# Patient Record
Sex: FEMALE | Race: WHITE | NOT HISPANIC OR LATINO
[De-identification: names, ages, dates, MRNs, and addresses within clinical notes are randomized per-mention and may not be internally consistent; named-entity substitution may affect disease eponyms.]

---

## 2021-08-02 ENCOUNTER — APPOINTMENT (OUTPATIENT)
Dept: INTERNAL MEDICINE | Facility: CLINIC | Age: 66
End: 2021-08-02
Payer: MEDICARE

## 2021-08-02 VITALS
WEIGHT: 117 LBS | SYSTOLIC BLOOD PRESSURE: 170 MMHG | TEMPERATURE: 98.1 F | HEIGHT: 66 IN | DIASTOLIC BLOOD PRESSURE: 90 MMHG | BODY MASS INDEX: 18.8 KG/M2

## 2021-08-02 VITALS — SYSTOLIC BLOOD PRESSURE: 132 MMHG | DIASTOLIC BLOOD PRESSURE: 80 MMHG

## 2021-08-02 DIAGNOSIS — Z85.828 PERSONAL HISTORY OF OTHER MALIGNANT NEOPLASM OF SKIN: ICD-10-CM

## 2021-08-02 DIAGNOSIS — Z78.9 OTHER SPECIFIED HEALTH STATUS: ICD-10-CM

## 2021-08-02 DIAGNOSIS — Z87.42 PERSONAL HISTORY OF OTHER DISEASES OF THE FEMALE GENITAL TRACT: ICD-10-CM

## 2021-08-02 DIAGNOSIS — Z82.49 FAMILY HISTORY OF ISCHEMIC HEART DISEASE AND OTHER DISEASES OF THE CIRCULATORY SYSTEM: ICD-10-CM

## 2021-08-02 DIAGNOSIS — Z82.3 FAMILY HISTORY OF STROKE: ICD-10-CM

## 2021-08-02 PROCEDURE — 99204 OFFICE O/P NEW MOD 45 MIN: CPT | Mod: 25

## 2021-08-02 PROCEDURE — 36415 COLL VENOUS BLD VENIPUNCTURE: CPT

## 2021-08-02 NOTE — HISTORY OF PRESENT ILLNESS
[FreeTextEntry8] : 66 year old F comes to establish medical care and for an acute visit. Prior followed by Evin Ortiz at [facility]. She prefers to switch to the [employer] system. \par \par She woke up last Thursday morning with low grade fever around 99F, had 1 reading of 100.7. She developed bilateral generalized abdominal discomfort described as aching with gas sensation. No radiation of pain. Sx not exacerbated with food intake. She denies N/V. She had 1 semi-loose bowel movement but otherwise no diarrhea. No urinary complaints. Recently travelled to [place] in 6/21. No new exotic foods. She has a sensitivity to soy products. She took Motrin and Tylenol with minimal relief. She had normal colonoscopy in 12/18.

## 2021-08-02 NOTE — REVIEW OF SYSTEMS
[Abdominal Pain] : abdominal pain [Nausea] : no nausea [Constipation] : no constipation [Diarrhea] : diarrhea [Heartburn] : no heartburn [Melena] : no melena [Dysuria] : no dysuria [Incontinence] : no incontinence [Hematuria] : no hematuria [Frequency] : no frequency [Joint Pain] : no joint pain [Back Pain] : no back pain [Itching] : no itching [Skin Rash] : no skin rash [Headache] : no headache [Dizziness] : no dizziness [Unsteady Walking] : no ataxia [Negative] : Respiratory

## 2021-08-02 NOTE — PHYSICAL EXAM
[No Lymphadenopathy] : no lymphadenopathy [Supple] : supple [Normal] : normal rate, regular rhythm, normal S1 and S2 and no murmur heard [No Edema] : there was no peripheral edema [Soft] : abdomen soft [Non Tender] : non-tender [Non-distended] : non-distended [No Masses] : no abdominal mass palpated [Normal Bowel Sounds] : normal bowel sounds [No Hernias] : no hernias [No CVA Tenderness] : no CVA  tenderness [No Rash] : no rash [Normal Gait] : normal gait [Normal Affect] : the affect was normal [Normal Mood] : the mood was normal [de-identified] : friendly, appears well  [de-identified] : multiple macules over torso

## 2021-08-04 ENCOUNTER — NON-APPOINTMENT (OUTPATIENT)
Age: 66
End: 2021-08-04

## 2021-08-04 LAB
ALBUMIN SERPL ELPH-MCNC: 4.6 G/DL
ALP BLD-CCNC: 104 U/L
ALT SERPL-CCNC: 9 U/L
AMYLASE/CREAT SERPL: 42 U/L
ANION GAP SERPL CALC-SCNC: 16 MMOL/L
APPEARANCE: CLEAR
AST SERPL-CCNC: 13 U/L
BACTERIA: NEGATIVE
BASOPHILS # BLD AUTO: 0.04 K/UL
BASOPHILS NFR BLD AUTO: 0.4 %
BILIRUB SERPL-MCNC: 0.8 MG/DL
BILIRUBIN URINE: NEGATIVE
BLOOD URINE: NEGATIVE
BUN SERPL-MCNC: 5 MG/DL
CALCIUM SERPL-MCNC: 9.5 MG/DL
CHLORIDE SERPL-SCNC: 88 MMOL/L
CO2 SERPL-SCNC: 25 MMOL/L
COLOR: NORMAL
CREAT SERPL-MCNC: 0.48 MG/DL
CRP SERPL-MCNC: 172 MG/L
EOSINOPHIL # BLD AUTO: 0.1 K/UL
EOSINOPHIL NFR BLD AUTO: 0.9 %
ERYTHROCYTE [SEDIMENTATION RATE] IN BLOOD BY WESTERGREN METHOD: 27 MM/HR
GLUCOSE QUALITATIVE U: NEGATIVE
GLUCOSE SERPL-MCNC: 123 MG/DL
HCT VFR BLD CALC: 42.8 %
HGB BLD-MCNC: 14.2 G/DL
HYALINE CASTS: 0 /LPF
IMM GRANULOCYTES NFR BLD AUTO: 0.3 %
KETONES URINE: NORMAL
LEUKOCYTE ESTERASE URINE: NEGATIVE
LPL SERPL-CCNC: 14 U/L
LYMPHOCYTES # BLD AUTO: 1.58 K/UL
LYMPHOCYTES NFR BLD AUTO: 14.2 %
MAN DIFF?: NORMAL
MCHC RBC-ENTMCNC: 30.2 PG
MCHC RBC-ENTMCNC: 33.2 GM/DL
MCV RBC AUTO: 91.1 FL
MICROSCOPIC-UA: NORMAL
MONOCYTES # BLD AUTO: 0.97 K/UL
MONOCYTES NFR BLD AUTO: 8.7 %
NEUTROPHILS # BLD AUTO: 8.4 K/UL
NEUTROPHILS NFR BLD AUTO: 75.5 %
NITRITE URINE: NEGATIVE
PH URINE: 6.5
PLATELET # BLD AUTO: 391 K/UL
POTASSIUM SERPL-SCNC: 3.6 MMOL/L
PROT SERPL-MCNC: 7.7 G/DL
PROTEIN URINE: NEGATIVE
RBC # BLD: 4.7 M/UL
RBC # FLD: 12.4 %
RED BLOOD CELLS URINE: 0 /HPF
SODIUM SERPL-SCNC: 129 MMOL/L
SPECIFIC GRAVITY URINE: 1
SQUAMOUS EPITHELIAL CELLS: 0 /HPF
UROBILINOGEN URINE: NORMAL
WBC # FLD AUTO: 11.12 K/UL
WHITE BLOOD CELLS URINE: 1 /HPF

## 2021-08-05 LAB — BACTERIA UR CULT: NORMAL

## 2022-01-11 ENCOUNTER — APPOINTMENT (OUTPATIENT)
Dept: INTERNAL MEDICINE | Facility: CLINIC | Age: 67
End: 2022-01-11
Payer: MEDICARE

## 2022-01-11 ENCOUNTER — TRANSCRIPTION ENCOUNTER (OUTPATIENT)
Age: 67
End: 2022-01-11

## 2022-01-11 ENCOUNTER — NON-APPOINTMENT (OUTPATIENT)
Age: 67
End: 2022-01-11

## 2022-01-11 VITALS
OXYGEN SATURATION: 98 % | DIASTOLIC BLOOD PRESSURE: 100 MMHG | BODY MASS INDEX: 18.8 KG/M2 | HEART RATE: 113 BPM | WEIGHT: 117 LBS | HEIGHT: 66 IN | SYSTOLIC BLOOD PRESSURE: 150 MMHG | TEMPERATURE: 97.9 F

## 2022-01-11 VITALS — DIASTOLIC BLOOD PRESSURE: 80 MMHG | SYSTOLIC BLOOD PRESSURE: 148 MMHG

## 2022-01-11 DIAGNOSIS — Z23 ENCOUNTER FOR IMMUNIZATION: ICD-10-CM

## 2022-01-11 DIAGNOSIS — K52.9 NONINFECTIVE GASTROENTERITIS AND COLITIS, UNSPECIFIED: ICD-10-CM

## 2022-01-11 DIAGNOSIS — R50.9 FEVER, UNSPECIFIED: ICD-10-CM

## 2022-01-11 DIAGNOSIS — Z91.018 ALLERGY TO OTHER FOODS: ICD-10-CM

## 2022-01-11 PROCEDURE — 36415 COLL VENOUS BLD VENIPUNCTURE: CPT

## 2022-01-11 PROCEDURE — 93000 ELECTROCARDIOGRAM COMPLETE: CPT

## 2022-01-11 PROCEDURE — G0438: CPT

## 2022-01-11 RX ORDER — ALBUTEROL SULFATE 90 UG/1
108 (90 BASE) AEROSOL, METERED RESPIRATORY (INHALATION)
Qty: 3 | Refills: 3 | Status: ACTIVE | COMMUNITY

## 2022-01-11 NOTE — PHYSICAL EXAM
[No Acute Distress] : no acute distress [Well Nourished] : well nourished [Well Developed] : well developed [Well-Appearing] : well-appearing [Normal Sclera/Conjunctiva] : normal sclera/conjunctiva [PERRL] : pupils equal round and reactive to light [EOMI] : extraocular movements intact [Normal Outer Ear/Nose] : the outer ears and nose were normal in appearance [Normal Oropharynx] : the oropharynx was normal [Normal TMs] : both tympanic membranes were normal [No JVD] : no jugular venous distention [No Lymphadenopathy] : no lymphadenopathy [Supple] : supple [Thyroid Normal, No Nodules] : the thyroid was normal and there were no nodules present [No Respiratory Distress] : no respiratory distress  [No Accessory Muscle Use] : no accessory muscle use [Clear to Auscultation] : lungs were clear to auscultation bilaterally [Normal Rate] : normal rate  [Regular Rhythm] : with a regular rhythm [Normal S1, S2] : normal S1 and S2 [No Murmur] : no murmur heard [No Carotid Bruits] : no carotid bruits [No Varicosities] : no varicosities [Pedal Pulses Present] : the pedal pulses are present [No Edema] : there was no peripheral edema [Soft] : abdomen soft [Non Tender] : non-tender [Non-distended] : non-distended [No Masses] : no abdominal mass palpated [No HSM] : no HSM [Normal Bowel Sounds] : normal bowel sounds [Normal Posterior Cervical Nodes] : no posterior cervical lymphadenopathy [Normal Anterior Cervical Nodes] : no anterior cervical lymphadenopathy [No CVA Tenderness] : no CVA  tenderness [No Spinal Tenderness] : no spinal tenderness [No Joint Swelling] : no joint swelling [Grossly Normal Strength/Tone] : grossly normal strength/tone [No Rash] : no rash [Coordination Grossly Intact] : coordination grossly intact [No Focal Deficits] : no focal deficits [Normal Gait] : normal gait [Deep Tendon Reflexes (DTR)] : deep tendon reflexes were 2+ and symmetric [Normal Affect] : the affect was normal [Normal Mood] : the mood was normal [Normal Insight/Judgement] : insight and judgment were intact [de-identified] : friendly [de-identified] : defer to GYN [de-identified] : multiple nevi on torso and back

## 2022-01-11 NOTE — HEALTH RISK ASSESSMENT
[Excellent] : ~his/her~  mood as  excellent [Never] : Never [No] : In the past 12 months have you used drugs other than those required for medical reasons? No [No falls in past year] : Patient reported no falls in the past year [0] : 2) Feeling down, depressed, or hopeless: Not at all (0) [PHQ-2 Negative - No further assessment needed] : PHQ-2 Negative - No further assessment needed [With Family] : lives with family [Retired] : retired [] :  [# Of Children ___] : has [unfilled] children [Sexually Active] : sexually active [Fully functional (bathing, dressing, toileting, transferring, walking, feeding)] : Fully functional (bathing, dressing, toileting, transferring, walking, feeding) [Fully functional (using the telephone, shopping, preparing meals, housekeeping, doing laundry, using] : Fully functional and needs no help or supervision to perform IADLs (using the telephone, shopping, preparing meals, housekeeping, doing laundry, using transportation, managing medications and managing finances) [Smoke Detector] : smoke detector [Carbon Monoxide Detector] : carbon monoxide detector [Seat Belt] :  uses seat belt [Sunscreen] : uses sunscreen [Patient reported mammogram was normal] : Patient reported mammogram was normal [Patient reported colonoscopy was normal] : Patient reported colonoscopy was normal [[id number]] : 0 [High Risk Behavior] : no high risk behavior [Reports changes in hearing] : Reports no changes in hearing [Reports changes in vision] : Reports no changes in vision [Reports changes in dental health] : Reports no changes in dental health [MammogramDate] : 10/18 [PapSmearComments] : hysterectomy  [ColonoscopyDate] : 07/18 [ColonoscopyComments] : Dr. Preet Cooper

## 2022-01-11 NOTE — PLAN
[FreeTextEntry1] : Check routine fasting labs.\par Discussed diet, exercise, and weight maintenance. Referred to nutritionist to help her gain weight.\par Received COVID vaccine x 3. Declined flu vaccine today.\par Colonoscopy UTD. She will soon schedule for mammogram. \par Hx of HLD, check fasting lipids and A1c.\par BP borderline high due to white coat syndrome. \par Continue Singulair and Asmanex for asthma. Referred to allergist Dr. Boxer for management, has soy allergy.\par \par RTO in 1 yr for annual exam or earlier PRN for acute care.\par

## 2022-01-11 NOTE — HISTORY OF PRESENT ILLNESS
[FreeTextEntry1] : physical [de-identified] : Patient comes for an annual exam.\par \par She always feels nervous coming to doctor appointments. History of white coat syndrome. \par She has new SCC on right side of face, will soon undergo laser therapy. Follows closely with dermatologist.

## 2022-01-17 ENCOUNTER — TRANSCRIPTION ENCOUNTER (OUTPATIENT)
Age: 67
End: 2022-01-17

## 2022-01-17 DIAGNOSIS — E87.1 HYPO-OSMOLALITY AND HYPONATREMIA: ICD-10-CM

## 2022-01-17 LAB
25(OH)D3 SERPL-MCNC: 21.1 NG/ML
ALBUMIN SERPL ELPH-MCNC: 4.9 G/DL
ALP BLD-CCNC: 93 U/L
ALT SERPL-CCNC: 14 U/L
ANION GAP SERPL CALC-SCNC: 15 MMOL/L
APPEARANCE: CLEAR
AST SERPL-CCNC: 20 U/L
BACTERIA: NEGATIVE
BASOPHILS # BLD AUTO: 0.08 K/UL
BASOPHILS NFR BLD AUTO: 1.3 %
BILIRUB SERPL-MCNC: 0.8 MG/DL
BILIRUBIN URINE: NEGATIVE
BLOOD URINE: NEGATIVE
BUN SERPL-MCNC: 8 MG/DL
CALCIUM SERPL-MCNC: 9.3 MG/DL
CHLORIDE SERPL-SCNC: 91 MMOL/L
CHOLEST SERPL-MCNC: 231 MG/DL
CO2 SERPL-SCNC: 25 MMOL/L
COLOR: NORMAL
CREAT SERPL-MCNC: 0.6 MG/DL
CRP SERPL-MCNC: <3 MG/L
EOSINOPHIL # BLD AUTO: 0.25 K/UL
EOSINOPHIL NFR BLD AUTO: 4 %
ERYTHROCYTE [SEDIMENTATION RATE] IN BLOOD BY WESTERGREN METHOD: 11 MM/HR
ESTIMATED AVERAGE GLUCOSE: 120 MG/DL
GLUCOSE QUALITATIVE U: NEGATIVE
GLUCOSE SERPL-MCNC: 132 MG/DL
HBA1C MFR BLD HPLC: 5.8 %
HCT VFR BLD CALC: 46.6 %
HDLC SERPL-MCNC: 73 MG/DL
HGB BLD-MCNC: 15.2 G/DL
HYALINE CASTS: 0 /LPF
IMM GRANULOCYTES NFR BLD AUTO: 0.3 %
KETONES URINE: NORMAL
LDLC SERPL CALC-MCNC: 142 MG/DL
LEUKOCYTE ESTERASE URINE: NEGATIVE
LYMPHOCYTES # BLD AUTO: 1.3 K/UL
LYMPHOCYTES NFR BLD AUTO: 20.7 %
MAN DIFF?: NORMAL
MCHC RBC-ENTMCNC: 29.8 PG
MCHC RBC-ENTMCNC: 32.6 GM/DL
MCV RBC AUTO: 91.4 FL
MICROSCOPIC-UA: NORMAL
MONOCYTES # BLD AUTO: 0.52 K/UL
MONOCYTES NFR BLD AUTO: 8.3 %
NEUTROPHILS # BLD AUTO: 4.11 K/UL
NEUTROPHILS NFR BLD AUTO: 65.4 %
NITRITE URINE: NEGATIVE
NONHDLC SERPL-MCNC: 157 MG/DL
PH URINE: 7
PLATELET # BLD AUTO: 352 K/UL
POTASSIUM SERPL-SCNC: 3.8 MMOL/L
PROT SERPL-MCNC: 7.9 G/DL
PROTEIN URINE: NEGATIVE
RBC # BLD: 5.1 M/UL
RBC # FLD: 12.4 %
RED BLOOD CELLS URINE: 0 /HPF
SODIUM SERPL-SCNC: 131 MMOL/L
SPECIFIC GRAVITY URINE: 1.02
SQUAMOUS EPITHELIAL CELLS: 0 /HPF
TRIGL SERPL-MCNC: 75 MG/DL
TSH SERPL-ACNC: 1.97 UIU/ML
UROBILINOGEN URINE: NORMAL
VIT B12 SERPL-MCNC: 314 PG/ML
WBC # FLD AUTO: 6.28 K/UL
WHITE BLOOD CELLS URINE: 0 /HPF

## 2022-01-17 RX ORDER — MULTIVIT-MIN/FOLIC/VIT K/LYCOP 400-300MCG
50 MCG TABLET ORAL
Qty: 90 | Refills: 3 | Status: ACTIVE | COMMUNITY
Start: 2022-01-17

## 2022-02-09 ENCOUNTER — TRANSCRIPTION ENCOUNTER (OUTPATIENT)
Age: 67
End: 2022-02-09

## 2022-03-07 ENCOUNTER — TRANSCRIPTION ENCOUNTER (OUTPATIENT)
Age: 67
End: 2022-03-07

## 2022-03-10 ENCOUNTER — TRANSCRIPTION ENCOUNTER (OUTPATIENT)
Age: 67
End: 2022-03-10

## 2022-03-14 ENCOUNTER — TRANSCRIPTION ENCOUNTER (OUTPATIENT)
Age: 67
End: 2022-03-14

## 2022-03-15 ENCOUNTER — TRANSCRIPTION ENCOUNTER (OUTPATIENT)
Age: 67
End: 2022-03-15

## 2022-04-25 ENCOUNTER — TRANSCRIPTION ENCOUNTER (OUTPATIENT)
Age: 67
End: 2022-04-25

## 2022-04-25 ENCOUNTER — NON-APPOINTMENT (OUTPATIENT)
Age: 67
End: 2022-04-25

## 2022-05-31 ENCOUNTER — RX RENEWAL (OUTPATIENT)
Age: 67
End: 2022-05-31

## 2022-05-31 ENCOUNTER — TRANSCRIPTION ENCOUNTER (OUTPATIENT)
Age: 67
End: 2022-05-31

## 2022-06-27 ENCOUNTER — RX CHANGE (OUTPATIENT)
Age: 67
End: 2022-06-27

## 2022-06-29 ENCOUNTER — RX CHANGE (OUTPATIENT)
Age: 67
End: 2022-06-29

## 2022-07-11 ENCOUNTER — TRANSCRIPTION ENCOUNTER (OUTPATIENT)
Age: 67
End: 2022-07-11

## 2022-08-18 ENCOUNTER — TRANSCRIPTION ENCOUNTER (OUTPATIENT)
Age: 67
End: 2022-08-18

## 2022-12-08 ENCOUNTER — RX CHANGE (OUTPATIENT)
Age: 67
End: 2022-12-08

## 2023-02-28 ENCOUNTER — RX CHANGE (OUTPATIENT)
Age: 68
End: 2023-02-28

## 2023-02-28 ENCOUNTER — TRANSCRIPTION ENCOUNTER (OUTPATIENT)
Age: 68
End: 2023-02-28

## 2023-03-02 ENCOUNTER — TRANSCRIPTION ENCOUNTER (OUTPATIENT)
Age: 68
End: 2023-03-02

## 2023-03-02 RX ORDER — ALBUTEROL SULFATE 90 UG/1
108 (90 BASE) INHALANT RESPIRATORY (INHALATION)
Qty: 1 | Refills: 3 | Status: ACTIVE | COMMUNITY
Start: 2023-03-02 | End: 1900-01-01

## 2023-06-06 ENCOUNTER — TRANSCRIPTION ENCOUNTER (OUTPATIENT)
Age: 68
End: 2023-06-06

## 2023-06-06 ENCOUNTER — RX RENEWAL (OUTPATIENT)
Age: 68
End: 2023-06-06

## 2023-06-08 ENCOUNTER — TRANSCRIPTION ENCOUNTER (OUTPATIENT)
Age: 68
End: 2023-06-08

## 2023-06-12 ENCOUNTER — RX CHANGE (OUTPATIENT)
Age: 68
End: 2023-06-12

## 2023-06-14 NOTE — PLAN
[FreeTextEntry1] : I suspect she has possible acute gastroenteritis.\par Check labs - CBC, CMP, ESR, CRP, amylase, and lipase. Check UA and urine culture.\par If pain worsens, will send for CT A/P.\par Drink plenty of fluids. Switch to low fat / BRAT diet. Advance diet as tolerated. \par Hx of white coat syndrome, BP eventually normalized.\par Hx of hyperlipidemia, has not had labs since 2018. Will check fasting labs at next visit.\par \par RTO 1 month for annual physical.  1 pair

## 2023-06-20 ENCOUNTER — APPOINTMENT (OUTPATIENT)
Dept: INTERNAL MEDICINE | Facility: CLINIC | Age: 68
End: 2023-06-20
Payer: MEDICARE

## 2023-06-20 ENCOUNTER — NON-APPOINTMENT (OUTPATIENT)
Age: 68
End: 2023-06-20

## 2023-06-20 VITALS
HEART RATE: 86 BPM | BODY MASS INDEX: 18.96 KG/M2 | SYSTOLIC BLOOD PRESSURE: 152 MMHG | OXYGEN SATURATION: 98 % | WEIGHT: 118 LBS | HEIGHT: 66 IN | DIASTOLIC BLOOD PRESSURE: 72 MMHG

## 2023-06-20 VITALS — DIASTOLIC BLOOD PRESSURE: 80 MMHG | SYSTOLIC BLOOD PRESSURE: 156 MMHG

## 2023-06-20 DIAGNOSIS — Z12.31 ENCOUNTER FOR SCREENING MAMMOGRAM FOR MALIGNANT NEOPLASM OF BREAST: ICD-10-CM

## 2023-06-20 DIAGNOSIS — Z85.828 PERSONAL HISTORY OF OTHER MALIGNANT NEOPLASM OF SKIN: ICD-10-CM

## 2023-06-20 PROCEDURE — 93000 ELECTROCARDIOGRAM COMPLETE: CPT | Mod: 59

## 2023-06-20 PROCEDURE — G0439: CPT

## 2023-06-20 NOTE — HEALTH RISK ASSESSMENT
[Excellent] : ~his/her~  mood as  excellent [No] : In the past 12 months have you used drugs other than those required for medical reasons? No [No falls in past year] : Patient reported no falls in the past year [0] : 2) Feeling down, depressed, or hopeless: Not at all (0) [PHQ-2 Negative - No further assessment needed] : PHQ-2 Negative - No further assessment needed [Patient reported mammogram was normal] : Patient reported mammogram was normal [Patient reported colonoscopy was normal] : Patient reported colonoscopy was normal [With Family] : lives with family [Retired] : retired [] :  [# Of Children ___] : has [unfilled] children [Sexually Active] : sexually active [Fully functional (bathing, dressing, toileting, transferring, walking, feeding)] : Fully functional (bathing, dressing, toileting, transferring, walking, feeding) [Fully functional (using the telephone, shopping, preparing meals, housekeeping, doing laundry, using] : Fully functional and needs no help or supervision to perform IADLs (using the telephone, shopping, preparing meals, housekeeping, doing laundry, using transportation, managing medications and managing finances) [Smoke Detector] : smoke detector [Carbon Monoxide Detector] : carbon monoxide detector [Seat Belt] :  uses seat belt [Sunscreen] : uses sunscreen [Never] : Never [[id number]] : 0 [High Risk Behavior] : no high risk behavior [Reports changes in hearing] : Reports no changes in hearing [Reports changes in vision] : Reports no changes in vision [Reports changes in dental health] : Reports no changes in dental health [MammogramDate] : 07/22 [MammogramComments] : BIRADS 1 [PapSmearComments] : hysterectomy  [ColonoscopyDate] : 07/18 [ColonoscopyComments] : Dr. Preet Cooper

## 2023-06-20 NOTE — PHYSICAL EXAM
[No Acute Distress] : no acute distress [Well Nourished] : well nourished [Well Developed] : well developed [Well-Appearing] : well-appearing [Normal Sclera/Conjunctiva] : normal sclera/conjunctiva [PERRL] : pupils equal round and reactive to light [EOMI] : extraocular movements intact [Normal Outer Ear/Nose] : the outer ears and nose were normal in appearance [Normal Oropharynx] : the oropharynx was normal [No JVD] : no jugular venous distention [No Lymphadenopathy] : no lymphadenopathy [Supple] : supple [Thyroid Normal, No Nodules] : the thyroid was normal and there were no nodules present [No Respiratory Distress] : no respiratory distress  [No Accessory Muscle Use] : no accessory muscle use [Clear to Auscultation] : lungs were clear to auscultation bilaterally [Normal Rate] : normal rate  [Regular Rhythm] : with a regular rhythm [Normal S1, S2] : normal S1 and S2 [No Murmur] : no murmur heard [No Carotid Bruits] : no carotid bruits [No Varicosities] : no varicosities [Pedal Pulses Present] : the pedal pulses are present [No Edema] : there was no peripheral edema [Soft] : abdomen soft [Non Tender] : non-tender [Non-distended] : non-distended [No Masses] : no abdominal mass palpated [No HSM] : no HSM [Normal Bowel Sounds] : normal bowel sounds [No CVA Tenderness] : no CVA  tenderness [No Spinal Tenderness] : no spinal tenderness [No Joint Swelling] : no joint swelling [Grossly Normal Strength/Tone] : grossly normal strength/tone [No Rash] : no rash [Coordination Grossly Intact] : coordination grossly intact [No Focal Deficits] : no focal deficits [Normal Gait] : normal gait [Deep Tendon Reflexes (DTR)] : deep tendon reflexes were 2+ and symmetric [Normal Affect] : the affect was normal [Normal Mood] : the mood was normal [Normal Insight/Judgement] : insight and judgment were intact [de-identified] : friendly [de-identified] : b/l occluded dry cerumen [de-identified] : defer to GYN [de-identified] : dry erythematous patches on face; multiple nevi/ seborrheic keratoses on torso and back

## 2023-06-20 NOTE — PLAN
[FreeTextEntry1] : Check routine labs. Not fasting today so provided lab slip.\par Discussed diet, exercise, and weight maintenance. Weight normal. \par Received COVID vaccine x 4. Declined flu and pneumococcal vaccines.\par Colonoscopy UTD. \par Schedule mammogram for 7/23, Rx provide.\par Hx of HLD and borderline DM, check fasting lipids and A1c. Watches diet well.\par BP always remains elevated due to white coat syndrome. \par Continue Singulair and Asmanex for asthma. She will schedule consult with allergist Dr. Boxer, has soy allergy.\par Followed with derm, hx of BCC and SCC.\par Check vitamin D level, continue supplement.\par \par RTO in 1 yr for annual exam or earlier PRN for acute care.\par

## 2023-06-20 NOTE — HISTORY OF PRESENT ILLNESS
[FreeTextEntry1] : physical [de-identified] : Patient comes for an annual exam.\par \par She reports feeling well. \par Recently had recurrent SCC of face, had radiation treatment. Followed with derm in [place].\par She travels to [place] often, has home there.

## 2023-06-22 LAB
BASOPHILS # BLD AUTO: 0.06 K/UL
BASOPHILS NFR BLD AUTO: 1.1 %
EOSINOPHIL # BLD AUTO: 0.43 K/UL
EOSINOPHIL NFR BLD AUTO: 7.7 %
HCT VFR BLD CALC: 44.9 %
HGB BLD-MCNC: 15.2 G/DL
IMM GRANULOCYTES NFR BLD AUTO: 0.4 %
LYMPHOCYTES # BLD AUTO: 1.99 K/UL
LYMPHOCYTES NFR BLD AUTO: 35.4 %
MAN DIFF?: NORMAL
MCHC RBC-ENTMCNC: 30.8 PG
MCHC RBC-ENTMCNC: 33.9 GM/DL
MCV RBC AUTO: 91.1 FL
MONOCYTES # BLD AUTO: 0.59 K/UL
MONOCYTES NFR BLD AUTO: 10.5 %
NEUTROPHILS # BLD AUTO: 2.53 K/UL
NEUTROPHILS NFR BLD AUTO: 44.9 %
PLATELET # BLD AUTO: 326 K/UL
RBC # BLD: 4.93 M/UL
RBC # FLD: 12.4 %
WBC # FLD AUTO: 5.62 K/UL

## 2023-06-23 ENCOUNTER — TRANSCRIPTION ENCOUNTER (OUTPATIENT)
Age: 68
End: 2023-06-23

## 2023-06-23 DIAGNOSIS — E53.8 DEFICIENCY OF OTHER SPECIFIED B GROUP VITAMINS: ICD-10-CM

## 2023-06-23 LAB
25(OH)D3 SERPL-MCNC: 43.1 NG/ML
ALBUMIN SERPL ELPH-MCNC: 4.8 G/DL
ALP BLD-CCNC: 92 U/L
ALT SERPL-CCNC: 13 U/L
ANION GAP SERPL CALC-SCNC: 13 MMOL/L
APPEARANCE: CLEAR
AST SERPL-CCNC: 18 U/L
BACTERIA: NEGATIVE /HPF
BILIRUB SERPL-MCNC: 0.9 MG/DL
BILIRUBIN URINE: NEGATIVE
BLOOD URINE: NEGATIVE
BUN SERPL-MCNC: 8 MG/DL
CALCIUM SERPL-MCNC: 9.2 MG/DL
CAST: 0 /LPF
CHLORIDE SERPL-SCNC: 89 MMOL/L
CHOLEST SERPL-MCNC: 245 MG/DL
CO2 SERPL-SCNC: 28 MMOL/L
COLOR: YELLOW
CREAT SERPL-MCNC: 0.59 MG/DL
EGFR: 98 ML/MIN/1.73M2
EPITHELIAL CELLS: 0 /HPF
ESTIMATED AVERAGE GLUCOSE: 120 MG/DL
GLUCOSE QUALITATIVE U: NEGATIVE MG/DL
GLUCOSE SERPL-MCNC: 109 MG/DL
HBA1C MFR BLD HPLC: 5.8 %
HDLC SERPL-MCNC: 81 MG/DL
KETONES URINE: NEGATIVE MG/DL
LDLC SERPL CALC-MCNC: 151 MG/DL
LEUKOCYTE ESTERASE URINE: NEGATIVE
MICROSCOPIC-UA: NORMAL
NITRITE URINE: NEGATIVE
NONHDLC SERPL-MCNC: 164 MG/DL
PH URINE: 7.5
POTASSIUM SERPL-SCNC: 3.7 MMOL/L
PROT SERPL-MCNC: 7.5 G/DL
PROTEIN URINE: NEGATIVE MG/DL
RED BLOOD CELLS URINE: 0 /HPF
SODIUM SERPL-SCNC: 130 MMOL/L
SPECIFIC GRAVITY URINE: 1
TRIGL SERPL-MCNC: 66 MG/DL
TSH SERPL-ACNC: 3.13 UIU/ML
UROBILINOGEN URINE: 0.2 MG/DL
VIT B12 SERPL-MCNC: 290 PG/ML
WHITE BLOOD CELLS URINE: 0 /HPF

## 2023-06-23 RX ORDER — ACETAMINOPHEN/DIPHENHYDRAMINE 500MG-25MG
1000 TABLET ORAL DAILY
Qty: 90 | Refills: 0 | Status: ACTIVE | COMMUNITY
Start: 2023-06-23

## 2023-06-27 ENCOUNTER — TRANSCRIPTION ENCOUNTER (OUTPATIENT)
Age: 68
End: 2023-06-27

## 2023-09-26 ENCOUNTER — TRANSCRIPTION ENCOUNTER (OUTPATIENT)
Age: 68
End: 2023-09-26

## 2023-10-23 ENCOUNTER — RX RENEWAL (OUTPATIENT)
Age: 68
End: 2023-10-23

## 2023-10-23 ENCOUNTER — TRANSCRIPTION ENCOUNTER (OUTPATIENT)
Age: 68
End: 2023-10-23

## 2023-10-23 RX ORDER — MOMETASONE FUROATE 220 UG/1
220 INHALANT RESPIRATORY (INHALATION)
Qty: 3 | Refills: 3 | Status: ACTIVE | COMMUNITY
Start: 2023-10-23 | End: 1900-01-01

## 2023-12-12 ENCOUNTER — TRANSCRIPTION ENCOUNTER (OUTPATIENT)
Age: 68
End: 2023-12-12

## 2024-02-02 ENCOUNTER — TRANSCRIPTION ENCOUNTER (OUTPATIENT)
Age: 69
End: 2024-02-02

## 2024-03-06 ENCOUNTER — TRANSCRIPTION ENCOUNTER (OUTPATIENT)
Age: 69
End: 2024-03-06

## 2024-06-13 ENCOUNTER — TRANSCRIPTION ENCOUNTER (OUTPATIENT)
Age: 69
End: 2024-06-13

## 2024-06-13 RX ORDER — MONTELUKAST SODIUM 10 MG/1
10 TABLET, FILM COATED ORAL
Qty: 90 | Refills: 0 | Status: ACTIVE | COMMUNITY
Start: 1900-01-01 | End: 1900-01-01

## 2024-06-30 PROBLEM — R73.03 BORDERLINE DIABETES: Status: ACTIVE | Noted: 2022-01-17

## 2024-06-30 PROBLEM — E78.5 HYPERLIPIDEMIA: Status: ACTIVE | Noted: 2021-08-02

## 2024-06-30 PROBLEM — E55.9 VITAMIN D DEFICIENCY: Status: ACTIVE | Noted: 2022-01-17

## 2024-06-30 PROBLEM — J45.909 ASTHMA: Status: ACTIVE | Noted: 2021-08-02

## 2024-06-30 PROBLEM — Z00.00 ENCOUNTER FOR PREVENTIVE HEALTH EXAMINATION: Status: ACTIVE | Noted: 2021-08-02

## 2024-07-01 ENCOUNTER — NON-APPOINTMENT (OUTPATIENT)
Age: 69
End: 2024-07-01

## 2024-07-01 ENCOUNTER — APPOINTMENT (OUTPATIENT)
Dept: INTERNAL MEDICINE | Facility: CLINIC | Age: 69
End: 2024-07-01
Payer: MEDICARE

## 2024-07-01 VITALS
HEART RATE: 102 BPM | OXYGEN SATURATION: 96 % | SYSTOLIC BLOOD PRESSURE: 192 MMHG | DIASTOLIC BLOOD PRESSURE: 93 MMHG | HEIGHT: 66 IN | WEIGHT: 117 LBS | BODY MASS INDEX: 18.8 KG/M2 | RESPIRATION RATE: 12 BRPM

## 2024-07-01 VITALS — SYSTOLIC BLOOD PRESSURE: 166 MMHG | DIASTOLIC BLOOD PRESSURE: 80 MMHG

## 2024-07-01 DIAGNOSIS — L71.9 ROSACEA, UNSPECIFIED: ICD-10-CM

## 2024-07-01 DIAGNOSIS — R73.03 PREDIABETES.: ICD-10-CM

## 2024-07-01 DIAGNOSIS — E78.5 HYPERLIPIDEMIA, UNSPECIFIED: ICD-10-CM

## 2024-07-01 DIAGNOSIS — J45.909 UNSPECIFIED ASTHMA, UNCOMPLICATED: ICD-10-CM

## 2024-07-01 DIAGNOSIS — Z00.00 ENCOUNTER FOR GENERAL ADULT MEDICAL EXAMINATION W/OUT ABNORMAL FINDINGS: ICD-10-CM

## 2024-07-01 DIAGNOSIS — E55.9 VITAMIN D DEFICIENCY, UNSPECIFIED: ICD-10-CM

## 2024-07-01 DIAGNOSIS — R03.0 ELEVATED BLOOD-PRESSURE READING, W/OUT DIAGNOSIS OF HYPERTENSION: ICD-10-CM

## 2024-07-01 DIAGNOSIS — C44.320 SQUAMOUS CELL CARCINOMA OF SKIN OF UNSPECIFIED PARTS OF FACE: ICD-10-CM

## 2024-07-01 PROCEDURE — 93000 ELECTROCARDIOGRAM COMPLETE: CPT

## 2024-07-01 PROCEDURE — G0439: CPT

## 2024-07-12 ENCOUNTER — TRANSCRIPTION ENCOUNTER (OUTPATIENT)
Age: 69
End: 2024-07-12

## 2024-07-12 LAB
25(OH)D3 SERPL-MCNC: 38.8 NG/ML
ALBUMIN SERPL ELPH-MCNC: 4.7 G/DL
ALP BLD-CCNC: 80 U/L
ALT SERPL-CCNC: 17 U/L
ANION GAP SERPL CALC-SCNC: 13 MMOL/L
APPEARANCE: CLEAR
AST SERPL-CCNC: 24 U/L
BACTERIA: NEGATIVE /HPF
BASOPHILS # BLD AUTO: 0.04 K/UL
BASOPHILS NFR BLD AUTO: 0.7 %
BILIRUB SERPL-MCNC: 0.8 MG/DL
BILIRUBIN URINE: NEGATIVE
BLOOD URINE: NEGATIVE
BUN SERPL-MCNC: 7 MG/DL
CALCIUM SERPL-MCNC: 9.1 MG/DL
CAST: 0 /LPF
CHLORIDE SERPL-SCNC: 87 MMOL/L
CHOLEST SERPL-MCNC: 226 MG/DL
CO2 SERPL-SCNC: 28 MMOL/L
COLOR: YELLOW
CREAT SERPL-MCNC: 0.59 MG/DL
EGFR: 97 ML/MIN/1.73M2
EOSINOPHIL # BLD AUTO: 0.27 K/UL
EOSINOPHIL NFR BLD AUTO: 4.6 %
EPITHELIAL CELLS: 0 /HPF
ESTIMATED AVERAGE GLUCOSE: 114 MG/DL
GLUCOSE QUALITATIVE U: NEGATIVE MG/DL
GLUCOSE SERPL-MCNC: 117 MG/DL
HBA1C MFR BLD HPLC: 5.6 %
HCT VFR BLD CALC: 42.1 %
HDLC SERPL-MCNC: 72 MG/DL
HGB BLD-MCNC: 14.6 G/DL
IMM GRANULOCYTES NFR BLD AUTO: 0.3 %
KETONES URINE: NEGATIVE MG/DL
LDLC SERPL CALC-MCNC: 142 MG/DL
LEUKOCYTE ESTERASE URINE: NEGATIVE
LYMPHOCYTES # BLD AUTO: 1.75 K/UL
LYMPHOCYTES NFR BLD AUTO: 30 %
MAN DIFF?: NORMAL
MCHC RBC-ENTMCNC: 30.2 PG
MCHC RBC-ENTMCNC: 34.7 GM/DL
MCV RBC AUTO: 87 FL
MICROSCOPIC-UA: NORMAL
MONOCYTES # BLD AUTO: 0.56 K/UL
MONOCYTES NFR BLD AUTO: 9.6 %
NEUTROPHILS # BLD AUTO: 3.2 K/UL
NEUTROPHILS NFR BLD AUTO: 54.8 %
NITRITE URINE: NEGATIVE
NONHDLC SERPL-MCNC: 154 MG/DL
PH URINE: 7.5
PLATELET # BLD AUTO: 324 K/UL
POTASSIUM SERPL-SCNC: 3.9 MMOL/L
PROT SERPL-MCNC: 7.6 G/DL
PROTEIN URINE: NEGATIVE MG/DL
RBC # BLD: 4.84 M/UL
RBC # FLD: 12.7 %
RED BLOOD CELLS URINE: 1 /HPF
SODIUM SERPL-SCNC: 128 MMOL/L
SPECIFIC GRAVITY URINE: 1
TRIGL SERPL-MCNC: 70 MG/DL
TSH SERPL-ACNC: 2.71 UIU/ML
UROBILINOGEN URINE: 0.2 MG/DL
VIT B12 SERPL-MCNC: 279 PG/ML
WBC # FLD AUTO: 5.84 K/UL
WHITE BLOOD CELLS URINE: 0 /HPF

## 2024-07-15 ENCOUNTER — TRANSCRIPTION ENCOUNTER (OUTPATIENT)
Age: 69
End: 2024-07-15

## 2024-07-16 ENCOUNTER — TRANSCRIPTION ENCOUNTER (OUTPATIENT)
Age: 69
End: 2024-07-16

## 2024-07-18 ENCOUNTER — TRANSCRIPTION ENCOUNTER (OUTPATIENT)
Age: 69
End: 2024-07-18

## 2024-08-13 ENCOUNTER — RX RENEWAL (OUTPATIENT)
Age: 69
End: 2024-08-13

## 2024-08-13 ENCOUNTER — TRANSCRIPTION ENCOUNTER (OUTPATIENT)
Age: 69
End: 2024-08-13

## 2024-08-17 ENCOUNTER — TRANSCRIPTION ENCOUNTER (OUTPATIENT)
Age: 69
End: 2024-08-17

## 2024-11-19 ENCOUNTER — NON-APPOINTMENT (OUTPATIENT)
Age: 69
End: 2024-11-19

## 2024-11-19 ENCOUNTER — APPOINTMENT (OUTPATIENT)
Dept: ALLERGY | Facility: CLINIC | Age: 69
End: 2024-11-19
Payer: MEDICARE

## 2024-11-19 PROCEDURE — 95012 NITRIC OXIDE EXP GAS DETER: CPT

## 2024-11-19 PROCEDURE — 99204 OFFICE O/P NEW MOD 45 MIN: CPT | Mod: 25

## 2024-11-19 PROCEDURE — 94060 EVALUATION OF WHEEZING: CPT

## 2024-11-19 PROCEDURE — 95004 PERQ TESTS W/ALRGNC XTRCS: CPT

## 2024-12-12 ENCOUNTER — TRANSCRIPTION ENCOUNTER (OUTPATIENT)
Age: 69
End: 2024-12-12

## 2024-12-17 ENCOUNTER — TRANSCRIPTION ENCOUNTER (OUTPATIENT)
Age: 69
End: 2024-12-17

## 2024-12-18 ENCOUNTER — TRANSCRIPTION ENCOUNTER (OUTPATIENT)
Age: 69
End: 2024-12-18

## 2025-01-24 ENCOUNTER — INPATIENT (INPATIENT)
Facility: HOSPITAL | Age: 70
LOS: 1 days | Discharge: HOME | DRG: 312 | End: 2025-01-26
Attending: STUDENT IN AN ORGANIZED HEALTH CARE EDUCATION/TRAINING PROGRAM | Admitting: INTERNAL MEDICINE
Payer: MEDICARE

## 2025-01-24 VITALS
HEART RATE: 154 BPM | RESPIRATION RATE: 18 BRPM | SYSTOLIC BLOOD PRESSURE: 142 MMHG | HEIGHT: 66 IN | OXYGEN SATURATION: 98 % | DIASTOLIC BLOOD PRESSURE: 77 MMHG | TEMPERATURE: 97 F | WEIGHT: 117.07 LBS

## 2025-01-24 DIAGNOSIS — Z29.9 ENCOUNTER FOR PROPHYLACTIC MEASURES, UNSPECIFIED: ICD-10-CM

## 2025-01-24 DIAGNOSIS — S01.81XA LACERATION WITHOUT FOREIGN BODY OF OTHER PART OF HEAD, INITIAL ENCOUNTER: ICD-10-CM

## 2025-01-24 DIAGNOSIS — I48.91 UNSPECIFIED ATRIAL FIBRILLATION: ICD-10-CM

## 2025-01-24 DIAGNOSIS — J45.909 UNSPECIFIED ASTHMA, UNCOMPLICATED: ICD-10-CM

## 2025-01-24 DIAGNOSIS — R55 SYNCOPE AND COLLAPSE: ICD-10-CM

## 2025-01-24 DIAGNOSIS — Z90.710 ACQUIRED ABSENCE OF BOTH CERVIX AND UTERUS: Chronic | ICD-10-CM

## 2025-01-24 LAB
ALBUMIN SERPL ELPH-MCNC: 4.4 G/DL — SIGNIFICANT CHANGE UP (ref 3.3–5)
ALP SERPL-CCNC: 104 U/L — SIGNIFICANT CHANGE UP (ref 40–120)
ALT FLD-CCNC: 28 U/L — SIGNIFICANT CHANGE UP (ref 12–78)
ANION GAP SERPL CALC-SCNC: 5 MMOL/L — SIGNIFICANT CHANGE UP (ref 5–17)
APTT BLD: 30.7 SEC — SIGNIFICANT CHANGE UP (ref 24.5–35.6)
APTT BLD: 30.9 SEC — SIGNIFICANT CHANGE UP (ref 24.5–35.6)
AST SERPL-CCNC: 30 U/L — SIGNIFICANT CHANGE UP (ref 15–37)
BASOPHILS # BLD AUTO: 0.04 K/UL — SIGNIFICANT CHANGE UP (ref 0–0.2)
BASOPHILS NFR BLD AUTO: 0.8 % — SIGNIFICANT CHANGE UP (ref 0–2)
BILIRUB SERPL-MCNC: 0.6 MG/DL — SIGNIFICANT CHANGE UP (ref 0.2–1.2)
BUN SERPL-MCNC: 8 MG/DL — SIGNIFICANT CHANGE UP (ref 7–23)
CALCIUM SERPL-MCNC: 9 MG/DL — SIGNIFICANT CHANGE UP (ref 8.5–10.1)
CHLORIDE SERPL-SCNC: 98 MMOL/L — SIGNIFICANT CHANGE UP (ref 96–108)
CO2 SERPL-SCNC: 29 MMOL/L — SIGNIFICANT CHANGE UP (ref 22–31)
CREAT SERPL-MCNC: 0.76 MG/DL — SIGNIFICANT CHANGE UP (ref 0.5–1.3)
EGFR: 84 ML/MIN/1.73M2 — SIGNIFICANT CHANGE UP
EOSINOPHIL # BLD AUTO: 0.29 K/UL — SIGNIFICANT CHANGE UP (ref 0–0.5)
EOSINOPHIL NFR BLD AUTO: 5.7 % — SIGNIFICANT CHANGE UP (ref 0–6)
GLUCOSE SERPL-MCNC: 136 MG/DL — HIGH (ref 70–99)
HCT VFR BLD CALC: 47.3 % — HIGH (ref 34.5–45)
HGB BLD-MCNC: 16.7 G/DL — HIGH (ref 11.5–15.5)
IMM GRANULOCYTES NFR BLD AUTO: 0.4 % — SIGNIFICANT CHANGE UP (ref 0–0.9)
INR BLD: 0.92 RATIO — SIGNIFICANT CHANGE UP (ref 0.85–1.16)
INR BLD: 0.94 RATIO — SIGNIFICANT CHANGE UP (ref 0.85–1.16)
LYMPHOCYTES # BLD AUTO: 1.2 K/UL — SIGNIFICANT CHANGE UP (ref 1–3.3)
LYMPHOCYTES # BLD AUTO: 23.6 % — SIGNIFICANT CHANGE UP (ref 13–44)
MCHC RBC-ENTMCNC: 31.3 PG — SIGNIFICANT CHANGE UP (ref 27–34)
MCHC RBC-ENTMCNC: 35.3 G/DL — SIGNIFICANT CHANGE UP (ref 32–36)
MCV RBC AUTO: 88.6 FL — SIGNIFICANT CHANGE UP (ref 80–100)
MONOCYTES # BLD AUTO: 0.46 K/UL — SIGNIFICANT CHANGE UP (ref 0–0.9)
MONOCYTES NFR BLD AUTO: 9.1 % — SIGNIFICANT CHANGE UP (ref 2–14)
NEUTROPHILS # BLD AUTO: 3.07 K/UL — SIGNIFICANT CHANGE UP (ref 1.8–7.4)
NEUTROPHILS NFR BLD AUTO: 60.4 % — SIGNIFICANT CHANGE UP (ref 43–77)
NRBC # BLD: 0 /100 WBCS — SIGNIFICANT CHANGE UP (ref 0–0)
NRBC BLD-RTO: 0 /100 WBCS — SIGNIFICANT CHANGE UP (ref 0–0)
NT-PROBNP SERPL-SCNC: 446 PG/ML — HIGH (ref 0–125)
PLATELET # BLD AUTO: 291 K/UL — SIGNIFICANT CHANGE UP (ref 150–400)
POTASSIUM SERPL-MCNC: 3.5 MMOL/L — SIGNIFICANT CHANGE UP (ref 3.5–5.3)
POTASSIUM SERPL-SCNC: 3.5 MMOL/L — SIGNIFICANT CHANGE UP (ref 3.5–5.3)
PROT SERPL-MCNC: 8.7 G/DL — HIGH (ref 6–8.3)
PROTHROM AB SERPL-ACNC: 10.9 SEC — SIGNIFICANT CHANGE UP (ref 9.9–13.4)
PROTHROM AB SERPL-ACNC: 11.1 SEC — SIGNIFICANT CHANGE UP (ref 9.9–13.4)
RBC # BLD: 5.34 M/UL — HIGH (ref 3.8–5.2)
RBC # FLD: 12.6 % — SIGNIFICANT CHANGE UP (ref 10.3–14.5)
SODIUM SERPL-SCNC: 132 MMOL/L — LOW (ref 135–145)
TROPONIN I, HIGH SENSITIVITY RESULT: 5 NG/L — SIGNIFICANT CHANGE UP
TROPONIN I, HIGH SENSITIVITY RESULT: 8.3 NG/L — SIGNIFICANT CHANGE UP
TSH SERPL-MCNC: 1.22 UIU/ML — SIGNIFICANT CHANGE UP (ref 0.36–3.74)
WBC # BLD: 5.08 K/UL — SIGNIFICANT CHANGE UP (ref 3.8–10.5)
WBC # FLD AUTO: 5.08 K/UL — SIGNIFICANT CHANGE UP (ref 3.8–10.5)

## 2025-01-24 PROCEDURE — 99223 1ST HOSP IP/OBS HIGH 75: CPT

## 2025-01-24 PROCEDURE — 70486 CT MAXILLOFACIAL W/O DYE: CPT | Mod: 26

## 2025-01-24 PROCEDURE — 70450 CT HEAD/BRAIN W/O DYE: CPT | Mod: 26

## 2025-01-24 PROCEDURE — 93010 ELECTROCARDIOGRAM REPORT: CPT | Mod: 76

## 2025-01-24 PROCEDURE — 99285 EMERGENCY DEPT VISIT HI MDM: CPT

## 2025-01-24 PROCEDURE — 72125 CT NECK SPINE W/O DYE: CPT | Mod: 26

## 2025-01-24 PROCEDURE — 71045 X-RAY EXAM CHEST 1 VIEW: CPT | Mod: 26

## 2025-01-24 PROCEDURE — 99223 1ST HOSP IP/OBS HIGH 75: CPT | Mod: GC

## 2025-01-24 RX ORDER — HEPARIN SODIUM,PORCINE 10000/ML
VIAL (ML) INJECTION
Qty: 25000 | Refills: 0 | Status: DISCONTINUED | OUTPATIENT
Start: 2025-01-24 | End: 2025-01-25

## 2025-01-24 RX ORDER — MONTELUKAST SODIUM 5 MG/1
1 TABLET, CHEWABLE ORAL
Refills: 0 | DISCHARGE

## 2025-01-24 RX ORDER — MOMETASONE FUROATE 220 UG/1
1 INHALANT RESPIRATORY (INHALATION) AT BEDTIME
Refills: 0 | Status: DISCONTINUED | OUTPATIENT
Start: 2025-01-24 | End: 2025-01-26

## 2025-01-24 RX ORDER — DILTIAZEM HYDROCHLORIDE 60 MG/1
10 TABLET ORAL ONCE
Refills: 0 | Status: COMPLETED | OUTPATIENT
Start: 2025-01-24 | End: 2025-01-24

## 2025-01-24 RX ORDER — HEPARIN SODIUM,PORCINE 10000/ML
4000 VIAL (ML) INJECTION EVERY 6 HOURS
Refills: 0 | Status: DISCONTINUED | OUTPATIENT
Start: 2025-01-24 | End: 2025-01-25

## 2025-01-24 RX ORDER — DILTIAZEM HYDROCHLORIDE 60 MG/1
10 TABLET ORAL
Qty: 125 | Refills: 0 | Status: DISCONTINUED | OUTPATIENT
Start: 2025-01-24 | End: 2025-01-24

## 2025-01-24 RX ORDER — MOMETASONE FUROATE 220 UG/1
1 INHALANT RESPIRATORY (INHALATION)
Refills: 0 | DISCHARGE

## 2025-01-24 RX ORDER — ACETAMINOPHEN 160 MG/5ML
1000 SUSPENSION ORAL ONCE
Refills: 0 | Status: COMPLETED | OUTPATIENT
Start: 2025-01-24 | End: 2025-01-24

## 2025-01-24 RX ORDER — HEPARIN SODIUM,PORCINE 10000/ML
4000 VIAL (ML) INJECTION ONCE
Refills: 0 | Status: COMPLETED | OUTPATIENT
Start: 2025-01-24 | End: 2025-01-24

## 2025-01-24 RX ORDER — ALBUTEROL 90 MCG
2 AEROSOL REFILL (GRAM) INHALATION
Refills: 0 | DISCHARGE

## 2025-01-24 RX ORDER — DILTIAZEM HYDROCHLORIDE 60 MG/1
15 TABLET ORAL ONCE
Refills: 0 | Status: COMPLETED | OUTPATIENT
Start: 2025-01-24 | End: 2025-01-24

## 2025-01-24 RX ORDER — CLOSTRIDIUM TETANI TOXOID ANTIGEN (FORMALDEHYDE INACTIVATED), CORYNEBACTERIUM DIPHTHERIAE TOXOID ANTIGEN (FORMALDEHYDE INACTIVATED), BORDETELLA PERTUSSIS TOXOID ANTIGEN (GLUTARALDEHYDE INACTIVATED), BORDETELLA PERTUSSIS FILAMENTOUS HEMAGGLUTININ ANTIGEN (FORMALDEHYDE INACTIVATED), BORDETELLA PERTUSSIS PERTACTIN ANTIGEN, AND BORDETELLA PERTUSSIS FIMBRIAE 2/3 ANTIGEN 5; 2; 2.5; 5; 3; 5 [LF]/.5ML; [LF]/.5ML; UG/.5ML; UG/.5ML; UG/.5ML; UG/.5ML
0.5 INJECTION, SUSPENSION INTRAMUSCULAR ONCE
Refills: 0 | Status: COMPLETED | OUTPATIENT
Start: 2025-01-24 | End: 2025-01-24

## 2025-01-24 RX ORDER — HEPARIN SODIUM,PORCINE 10000/ML
2000 VIAL (ML) INJECTION EVERY 6 HOURS
Refills: 0 | Status: DISCONTINUED | OUTPATIENT
Start: 2025-01-24 | End: 2025-01-25

## 2025-01-24 RX ORDER — BACTERIOSTATIC SODIUM CHLORIDE 0.9 %
1000 VIAL (ML) INJECTION ONCE
Refills: 0 | Status: COMPLETED | OUTPATIENT
Start: 2025-01-24 | End: 2025-01-24

## 2025-01-24 RX ORDER — ERYTHROMYCIN BASE 5 MG/GRAM
1 OINTMENT (GRAM) OPHTHALMIC (EYE) ONCE
Refills: 0 | Status: COMPLETED | OUTPATIENT
Start: 2025-01-24 | End: 2025-01-24

## 2025-01-24 RX ORDER — LIDOCAINE HCL/EPINEPHRINE 2 %-1:100K
10 VIAL (ML) INJECTION ONCE
Refills: 0 | Status: COMPLETED | OUTPATIENT
Start: 2025-01-24 | End: 2025-01-24

## 2025-01-24 RX ORDER — MONTELUKAST SODIUM 5 MG/1
10 TABLET, CHEWABLE ORAL AT BEDTIME
Refills: 0 | Status: DISCONTINUED | OUTPATIENT
Start: 2025-01-24 | End: 2025-01-26

## 2025-01-24 RX ORDER — POVIDONE-IODINE 0.01 ML/1
1 SWAB TOPICAL ONCE
Refills: 0 | Status: COMPLETED | OUTPATIENT
Start: 2025-01-24 | End: 2025-01-24

## 2025-01-24 RX ORDER — DILTIAZEM HYDROCHLORIDE 60 MG/1
5 TABLET ORAL
Qty: 125 | Refills: 0 | Status: DISCONTINUED | OUTPATIENT
Start: 2025-01-24 | End: 2025-01-25

## 2025-01-24 RX ADMIN — DILTIAZEM HYDROCHLORIDE 5 MG/HR: 60 TABLET ORAL at 16:13

## 2025-01-24 RX ADMIN — Medication 1000 MILLILITER(S): at 05:59

## 2025-01-24 RX ADMIN — DILTIAZEM HYDROCHLORIDE 10 MILLIGRAM(S): 60 TABLET ORAL at 07:18

## 2025-01-24 RX ADMIN — DILTIAZEM HYDROCHLORIDE 10 MILLIGRAM(S): 60 TABLET ORAL at 06:00

## 2025-01-24 RX ADMIN — Medication 4000 UNIT(S): at 18:27

## 2025-01-24 RX ADMIN — DILTIAZEM HYDROCHLORIDE 10 MG/HR: 60 TABLET ORAL at 09:23

## 2025-01-24 RX ADMIN — CLOSTRIDIUM TETANI TOXOID ANTIGEN (FORMALDEHYDE INACTIVATED), CORYNEBACTERIUM DIPHTHERIAE TOXOID ANTIGEN (FORMALDEHYDE INACTIVATED), BORDETELLA PERTUSSIS TOXOID ANTIGEN (GLUTARALDEHYDE INACTIVATED), BORDETELLA PERTUSSIS FILAMENTOUS HEMAGGLUTININ ANTIGEN (FORMALDEHYDE INACTIVATED), BORDETELLA PERTUSSIS PERTACTIN ANTIGEN, AND BORDETELLA PERTUSSIS FIMBRIAE 2/3 ANTIGEN 0.5 MILLILITER(S): 5; 2; 2.5; 5; 3; 5 INJECTION, SUSPENSION INTRAMUSCULAR at 05:29

## 2025-01-24 RX ADMIN — Medication 1000 UNIT(S)/HR: at 18:29

## 2025-01-24 RX ADMIN — Medication 0.5 MILLIGRAM(S): at 07:17

## 2025-01-24 RX ADMIN — DILTIAZEM HYDROCHLORIDE 15 MILLIGRAM(S): 60 TABLET ORAL at 09:21

## 2025-01-24 RX ADMIN — POVIDONE-IODINE 1 APPLICATION(S): 0.01 SWAB TOPICAL at 12:42

## 2025-01-24 RX ADMIN — Medication 1 APPLICATION(S): at 12:39

## 2025-01-24 RX ADMIN — Medication 10 MILLILITER(S): at 12:42

## 2025-01-24 RX ADMIN — MOMETASONE FUROATE 1 PUFF(S): 220 INHALANT RESPIRATORY (INHALATION) at 22:14

## 2025-01-24 RX ADMIN — MONTELUKAST SODIUM 10 MILLIGRAM(S): 5 TABLET, CHEWABLE ORAL at 22:13

## 2025-01-24 RX ADMIN — Medication 1000 UNIT(S)/HR: at 19:13

## 2025-01-24 NOTE — H&P ADULT - NSHPSOCIALHISTORY_GEN_ALL_CORE
Lives:  ADLs:  Diet:  Vaccination:  Occupation:  Alcohol Use:  Tobacco Use:  Recreational Drug Use:  Ambulation: Lives: At Home with   ADLs: Independent  Diet: Regular diet. No soybean products.  Alcohol Use: 1 glass of wine every night with dinner  Tobacco Use: Denies  Recreational Drug Use: Denies  Ambulation: No mechanical assistance

## 2025-01-24 NOTE — ED ADULT NURSE NOTE - NS ED NURSE RECORD ANOTHER VITAL SIGN
Attempted to contact patient via phone.  Left message to call back.    Add all return call messages to encounter date 2/29/24 (DO NOT open a new encounter).    Yes

## 2025-01-24 NOTE — CONSULT NOTE ADULT - SUBJECTIVE AND OBJECTIVE BOX
[facility]         Segundo Hurtado, Angela, Jese, Rachel, Selvin, Maru        [phone] (office)    Reason for Consult: afib     Interval HPI: Patient seen and examined at bedside. No acute events overnight.     HPI: 69-year-old female with history of asthma presents complaining of fall and right eyelid laceration.  Patient states she got up during the night to go to the bathroom.  Patient states she was keeping her eyes closed as she did not want to become fully awake during the process.  States after she got out of the bathroom she was walking back towards her bed and believes she syncopized and then fell.  Denies chest pain, palpitations, dizziness lightheadedness.  Patient states she feels thirsty thinks that she has not hydrated well during the day yesterday but she was busy as her daughter just had a baby.  Denies neck pain, abdominal pain, back pain, extremity pain.  Tetanus is not up-to-date.  No anticoagulants.      PAST MEDICAL & SURGICAL HISTORY:      SOCIAL HISTORY: No active tobacco, alcohol or illicit drug use    FAMILY HISTORY:      Home Medications:      MEDICATIONS  (STANDING):    MEDICATIONS  (PRN):      Allergies    Soybean (Other)  No Known Drug Allergies    Intolerances        REVIEW OF SYSTEMS: Negative except as per HPI.    VITAL SIGNS:   Vital Signs Last 24 Hrs  T(C): 36.8 (24 Jan 2025 07:51), Max: 36.8 (24 Jan 2025 07:51)  T(F): 98.2 (24 Jan 2025 07:51), Max: 98.2 (24 Jan 2025 07:51)  HR: 104 (24 Jan 2025 07:51) (104 - 166)  BP: 128/75 (24 Jan 2025 07:51) (128/75 - 142/77)  BP(mean): --  RR: 18 (24 Jan 2025 07:51) (18 - 22)  SpO2: 97% (24 Jan 2025 07:51) (97% - 98%)    Parameters below as of 24 Jan 2025 07:51  Patient On (Oxygen Delivery Method): room air        I&O's Summary      PHYSICAL EXAM:  Constitutional: NAD, well-developed  HEENT NC/AT, moist mucous membranes  Pulmonary: Non-labored, breath sounds are clear bilaterally, no wheezing, rales or rhonchi  Cardiovascular: +S1, S2, RRR, no murmur  Gastrointestinal: Soft, nontender, nondistended, normoactive bowel sounds  Extremities: No peripheral edema   Neurological: Alert, strength and sensitivity are grossly intact  Skin: No obvious lesions/rashes  Psych: Mood & affect appropriate    LABS: All Labs Reviewed:                        16.7   5.08  )-----------( 291      ( 24 Jan 2025 05:20 )             47.3     24 Jan 2025 05:20    132    |  98     |  8      ----------------------------<  136    3.5     |  29     |  0.76     Ca    9.0        24 Jan 2025 05:20    TPro  8.7    /  Alb  4.4    /  TBili  0.6    /  DBili  x      /  AST  30     /  ALT  28     /  AlkPhos  104    24 Jan 2025 05:20        EKG: VR: 159, afib with RVR, left axis deviation, septal infarct, age undetermined     RADIOLOGY:    CT HEAD:  No acute intracranial hemorrhage, mass effect, or midline shift.   Calvarium intact.    CT MAXILLOFACIAL:  No acute fracture. No orbital injury.    CT CERVICAL SPINE:  No acute fracture or traumatic subluxation.    Multi-level degenerative changes.   [facility]         Segundo Hurtado, Angela, Jese, Rachel, Maru Montalvo        [phone] (office)    Reason for Consult: afib     Interval HPI: Patient seen and examined at bedside. No acute events overnight. Pt's only PMHx is asthma and skin cancer and denies hx of cardiac disease, MIs,. She does not follow with a cardiologist. Pt reports yesterday she went to the bathroom in the middle of the night and on the way back to the bed, she fainted. She does not recall experiencing symptoms prior such as dizziness, lightheadedness, nausea, SOB, chest pain. Her  helped her up and she remembers waking up and feeling the cold floor.  states she was on the floor for less than 30 seconds and when he got to her she had already come to. Pt states she was very dehydrated yesterday and was very busy as her daughter just had a baby. Pt reports she had two similar episodes in the past with last one being 2 years ago over the summer. Reports in both situations she was dehydrated on those days. Denies prior smoking hx.     HPI: 69-year-old female with history of asthma presents complaining of fall and right eyelid laceration.  Patient states she got up during the night to go to the bathroom.  Patient states she was keeping her eyes closed as she did not want to become fully awake during the process.  States after she got out of the bathroom she was walking back towards her bed and believes she syncopized and then fell.  Denies chest pain, palpitations, dizziness lightheadedness.  Patient states she feels thirsty thinks that she has not hydrated well during the day yesterday but she was busy as her daughter just had a baby.  Denies neck pain, abdominal pain, back pain, extremity pain.  Tetanus is not up-to-date.  No anticoagulants.      PAST MEDICAL & SURGICAL HISTORY:      SOCIAL HISTORY: No active tobacco, alcohol or illicit drug use    FAMILY HISTORY:      Home Medications:      MEDICATIONS  (STANDING):    MEDICATIONS  (PRN):      Allergies    Soybean (Other)  No Known Drug Allergies    Intolerances        REVIEW OF SYSTEMS: Negative except as per HPI.    VITAL SIGNS:   Vital Signs Last 24 Hrs  T(C): 36.8 (24 Jan 2025 07:51), Max: 36.8 (24 Jan 2025 07:51)  T(F): 98.2 (24 Jan 2025 07:51), Max: 98.2 (24 Jan 2025 07:51)  HR: 104 (24 Jan 2025 07:51) (104 - 166)  BP: 128/75 (24 Jan 2025 07:51) (128/75 - 142/77)  BP(mean): --  RR: 18 (24 Jan 2025 07:51) (18 - 22)  SpO2: 97% (24 Jan 2025 07:51) (97% - 98%)    Parameters below as of 24 Jan 2025 07:51  Patient On (Oxygen Delivery Method): room air        I&O's Summary      PHYSICAL EXAM:  Constitutional: NAD, well-developed  HEENT NC/AT, moist mucous membranes  Pulmonary: Non-labored, breath sounds are clear bilaterally, no wheezing, rales or rhonchi  Cardiovascular: +S1, S2, RRR, no murmur  Gastrointestinal: Soft, nontender, nondistended, normoactive bowel sounds  Extremities: No peripheral edema   Neurological: Alert, strength and sensitivity are grossly intact  Skin: bruising on chin and wound on right side of forehead covered with gauze   Psych: Mood & affect appropriate    LABS: All Labs Reviewed:                        16.7   5.08  )-----------( 291      ( 24 Jan 2025 05:20 )             47.3     24 Jan 2025 05:20    132    |  98     |  8      ----------------------------<  136    3.5     |  29     |  0.76     Ca    9.0        24 Jan 2025 05:20    TPro  8.7    /  Alb  4.4    /  TBili  0.6    /  DBili  x      /  AST  30     /  ALT  28     /  AlkPhos  104    24 Jan 2025 05:20        EKG: VR: 159, afib with RVR, left axis deviation, septal infarct, age undetermined     RADIOLOGY:    CT HEAD:  No acute intracranial hemorrhage, mass effect, or midline shift.   Calvarium intact.    CT MAXILLOFACIAL:  No acute fracture. No orbital injury.    CT CERVICAL SPINE:  No acute fracture or traumatic subluxation.    Multi-level degenerative changes.

## 2025-01-24 NOTE — H&P ADULT - PROBLEM SELECTOR PLAN 1
Patient presenting with episode of syncope last night with head strike to the right eyelid  - Initial Vitals: BP: 142/77 , HR: 136, Temp: 97.3 F, RR: 18, SpO2: 98% on RA  - Initial Labs:  Trop 5.0, ProBNP 446, TSH 1.22, Na 132, K 3.5  - EKG: Atrial fibrillation with rapid ventricular response Left axis deviation Minimal voltage criteria for LVH, may be normal variant ( Baldo product ) Septal infarct , age undetermined.  - 1L NS Bolus x1, Diltiazem 10 mg IV push x2, Diltiazem 15 mg IV push x1, Diltiazem Drip initiated given in the ED  - CT HEAD: No acute intracranial hemorrhage, mass effect, or midline shift. Calvarium intact.  - CT MAXILLOFACIAL: No acute fracture. No orbital injury.  - CT CERVICAL SPINE: No acute fracture or traumatic subluxation. Multi-level degenerative changes.  - c/w Diltiazem drip  - CHADVASC score of 2.  - f/u TTE  - remote telemetry  - Monitor and replete lytes, keep K>4, Mg>2.  - Cardiology consulted (Dr. Sawant) f/u recs Patient presenting with episode of syncope last night with head strike to the right eyelid  - Initial Vitals: BP: 142/77 , HR: 136, Temp: 97.3 F, RR: 18, SpO2: 98% on RA  - Initial Labs:  Trop 5.0, ProBNP 446, TSH 1.22, Na 132, K 3.5  - EKG: Atrial fibrillation with rapid ventricular response Left axis deviation Minimal voltage criteria for LVH, may be normal variant ( Baldo product ) Septal infarct , age undetermined.  - 1L NS Bolus x1, Diltiazem 10 mg IV push x2, Diltiazem 15 mg IV push x1, Diltiazem Drip initiated given in the ED  - CT HEAD: No acute intracranial hemorrhage, mass effect, or midline shift. Calvarium intact.  - CT MAXILLOFACIAL: No acute fracture. No orbital injury.  - CT CERVICAL SPINE: No acute fracture or traumatic subluxation. Multi-level degenerative changes.  - c/w Diltiazem drip with plan to transition to oral agent  - CHADVASC score of 2.  - f/u TTE  - remote telemetry  -heparin gtt  - Monitor and replete lytes, keep K>4, Mg>2.  - Cardiology consulted (Dr. Sawant) f/u recs

## 2025-01-24 NOTE — CONSULT NOTE ADULT - ASSESSMENT
69yr old F with PMHx of  69yr old F with PMHx of asthma and skin cancer presents to ED for syncope overnight.     #Volume   -no signs of fluid overload on exam  -no prior TTE       #Ischemia   - No clear evidence of acute ischemia  - Troponin negative x 1  - EKG: VR: 159, afib with RVR, left axis deviation, septal infarct, age undetermined   - no hx of CAD   - Continue to monitor for signs or symptoms of ischemia     #Arrhythmia  - EKG: VR: 159, afib with RVR, left axis deviation, septal infarct, age undetermined   - Monitor and replete lytes, keep K>4, Mg>2.    #HTN  - BP stable currently  - Continue to monitor hemodynamics     - Other cardiovascular workup will depend on clinical course.  - All other workup per primary team.  - Will continue to follow.       69yr old F with PMHx of asthma and skin cancer presents to ED for syncope overnight. Pt admitted for afib with RVR.     #Volume   -no signs of fluid overload on exam  -no prior TTE   -order TTE, f/u results     #Ischemia   - No clear evidence of acute ischemia  - Troponin negative x 1  - EKG: VR: 159, afib with RVR, left axis deviation, septal infarct, age undetermined   - no hx of CAD   - would recommend ischemic eval outpt   - Continue to monitor for signs or symptoms of ischemia     #Arrhythmia  - EKG: VR: 159, afib with RVR, left axis deviation, septal infarct, age undetermined   - no prior hx of afib   - s/p cardizem 10mg x2 in ED   - continue cardizem gtt 10mg/hr  - hold AC until pt evaluated by plastics for forehead wound. Would recommend starting heparin first pending plastics approval   - Monitor and replete lytes, keep K>4, Mg>2.    #HTN  - BP stable currently  - no hx of HTN   - Continue to monitor hemodynamics     - Other cardiovascular workup will depend on clinical course.  - All other workup per primary team.  - Will continue to follow.

## 2025-01-24 NOTE — ED ADULT TRIAGE NOTE - CHIEF COMPLAINT QUOTE
Came in ambulatory from triage reports he woke up went to the bathroom and had syncopal episode and fell hitting her head on the baseboard sustaining laceration to the right eyebrow/eye lid.

## 2025-01-24 NOTE — PATIENT CHOICE NOTE. - NSPTCHOICESTATE_GEN_ALL_CORE

## 2025-01-24 NOTE — H&P ADULT - ASSESSMENT
70 yo F with PMH Asthma and Skin cancer presents to the ED with Syncope and right eyelid laceration admitted for new Atrial fibrillation with RVR.

## 2025-01-24 NOTE — CARE COORDINATION ASSESSMENT. - NSPASTMEDSURGHISTORY_GEN_ALL_CORE_FT
PAST MEDICAL & SURGICAL HISTORY:  History of skin cancer      Asthma      S/P JULIETTE (total abdominal hysterectomy)

## 2025-01-24 NOTE — H&P ADULT - HISTORY OF PRESENT ILLNESS
68 yo F with PMH Asthma and Skin cancer presents to the ED with Syncope and right eyelid laceration. Pt reports yesterday she went to the bathroom in the middle of the night and on the way back to the bed, she fainted. She does not recall experiencing symptoms prior such as dizziness, lightheadedness, nausea, SOB, chest pain. Her  helped her up and she remembers waking up and feeling the cold floor.  states she was on the floor for less than 30 seconds and when he got to her she had already come to. Pt states she was very dehydrated yesterday and was very busy as her daughter just had a baby. Pt reports she had two similar episodes in the past with last one being 2 years ago over the summer. Reports in both situations she was dehydrated on those days.    Denies fever, chills, chest pain, palpitations, SOB, cough, abdominal pain, nausea, vomiting, diarrhea, constipation, urinary frequency, urgency, or dysuria, headaches, changes in vision, dizziness, numbness, tingling.  Denies recent travel, recent antibiotic use, or sick contacts.    ED Course:   Vitals: BP: 142/77 , HR: 136, Temp: 97.3 F, RR: 18, SpO2: 98% on RA  Labs:  Trop 5.0, ProBNP 446, TSH 1.22, Na 132, K 3.5  CXR: as per personal read, official read pending   CT HEAD:No acute intracranial hemorrhage, mass effect, or midline shift. Calvarium intact.  CT MAXILLOFACIAL: No acute fracture. No orbital injury.  CT CERVICAL SPINE: No acute fracture or traumatic subluxation. Multi-level degenerative changes.  EKG: Atrial fibrillation with rapid ventricular response Left axis deviation Minimal voltage criteria for LVH, may be normal variant ( Baldo product ) Septal infarct , age undetermined.  1L NS Bolus x1, Diltiazem 10 mg IV push x2, Diltiazem 15 mg IV push x1, Diltiazem Drip initiated, Lorazepam 0.5 mg IV x1, Ofirmev x1, TDaP vaccine Received in the ED    68 yo F with PMH Asthma and Skin cancer presents to the ED with Syncope and right eyelid laceration. Pt reports yesterday she went to the bathroom in the middle of the night and on the way back to the bed, she fainted. She does not recall experiencing symptoms prior such as dizziness, lightheadedness, nausea, SOB, chest pain. Her  helped her up and she remembers waking up and feeling the cold floor.  states she was on the floor for less than 30 seconds and when he got to her she had already come to. Pt states she was very dehydrated yesterday and was very busy as her daughter just had a baby. Pt reports she had two similar episodes in the past with last one being 2 years ago over the summer. Reports in both situations she was dehydrated on those days. She denies any history of heart disease or arrhythmia in the past.    Denies fever, chills, chest pain, palpitations, SOB, cough, abdominal pain, nausea, vomiting, diarrhea, constipation, urinary frequency, urgency, or dysuria, headaches, changes in vision, dizziness, numbness, tingling.  Denies recent travel, recent antibiotic use, or sick contacts.    ED Course:   Vitals: BP: 142/77 , HR: 136, Temp: 97.3 F, RR: 18, SpO2: 98% on RA  Labs:  Trop 5.0, ProBNP 446, TSH 1.22, Na 132, K 3.5  CXR: as per personal read, official read pending   CT HEAD: No acute intracranial hemorrhage, mass effect, or midline shift. Calvarium intact.  CT MAXILLOFACIAL: No acute fracture. No orbital injury.  CT CERVICAL SPINE: No acute fracture or traumatic subluxation. Multi-level degenerative changes.  EKG: Atrial fibrillation with rapid ventricular response Left axis deviation Minimal voltage criteria for LVH, may be normal variant ( Stanley product ) Septal infarct , age undetermined.  1L NS Bolus x1, Diltiazem 10 mg IV push x2, Diltiazem 15 mg IV push x1, Diltiazem Drip initiated, Lorazepam 0.5 mg IV x1, Ofirmev x1, TDaP vaccine Received in the ED    68 yo F with PMH Asthma and Skin cancer presents to the ED with Syncope and right eyelid laceration. Pt reports yesterday she went to the bathroom in the middle of the night and on the way back to the bed, she fainted. She does not recall experiencing symptoms prior such as dizziness, lightheadedness, nausea, SOB, chest pain. She awoke within 30 seconds of fainting to her  helping her up off the floor.  states she was on the floor for less than 30 seconds and when he got to her she had already come to. Pt states she was very dehydrated yesterday and was very busy as her daughter just had a baby. Pt reports she had two similar episodes in the past with last one being 2 years ago over the summer. Reports in both situations she was dehydrated on those days. She denies any history of heart disease or arrhythmia in the past.    Denies fever, chills, chest pain, palpitations, SOB, cough, abdominal pain, nausea, vomiting, diarrhea, constipation, urinary frequency, urgency, or dysuria, headaches, changes in vision, dizziness, numbness, tingling.  Denies recent travel, recent antibiotic use, or sick contacts.    ED Course:   Vitals: BP: 142/77 , HR: 136, Temp: 97.3 F, RR: 18, SpO2: 98% on RA  Labs:  Trop 5.0, ProBNP 446, TSH 1.22, Na 132, K 3.5  CXR: Clear Lungs  CT HEAD: No acute intracranial hemorrhage, mass effect, or midline shift. Calvarium intact.  CT MAXILLOFACIAL: No acute fracture. No orbital injury.  CT CERVICAL SPINE: No acute fracture or traumatic subluxation. Multi-level degenerative changes.  EKG: Atrial fibrillation with rapid ventricular response Left axis deviation Minimal voltage criteria for LVH, may be normal variant ( Baldo product ) Septal infarct , age undetermined.  1L NS Bolus x1, Diltiazem 10 mg IV push x2, Diltiazem 15 mg IV push x1, Diltiazem Drip initiated, Lorazepam 0.5 mg IV x1, Ofirmev x1, TDaP vaccine Received in the ED

## 2025-01-24 NOTE — CARE COORDINATION ASSESSMENT. - NSCAREPROVIDERS_GEN_ALL_CORE_FT
CARE PROVIDERS:  Accepting Physician: Nick Brian  Access Services: Sofi Briceno  Administration: Kathy Evans  Admitting: Nick Brian  Attending: Nick Brian  Consultant: Indra Sawant  Consultant: Rekha Doran  Consultant: Isela Pena  Covering Nurse: Itz Gaffney  ED Attending: Yifan Almonte  ED Attending2: Lars Vazquez  ED Nurse: Octavia Palomares  ED Nurse 2: Eren Fletcher  Nurse: Yola Paul  Nurse: Octavia Palomares  Ordered: ServiceAccount, SCMMLM  Ordered: ADM, User  PCA/Nursing Assistant: Alfred Valdes  Primary Team: Ra Yanez  : Joelle Cordero  UR// Supp. Assoc.: Clary Enriquez// Supp. Assoc.: Marylou Wheat

## 2025-01-24 NOTE — ED PROVIDER NOTE - CLINICAL SUMMARY MEDICAL DECISION MAKING FREE TEXT BOX
69-year-old female with possible syncope and subsequent fall with head injury, right eyelid laceration.  Denies chest pain.  States she has whitecoat syndrome feels very nervous that is why her heart rate is elevated.  EKG shows possible A-fib but 159 bpm however R-R interval looks quite regular but this could be sinus tach with some motion artifact.  Will monitor patient.  Will check labs, CT head, CT cervical spine, CT maxillofacial.  Will give IV Tylenol, IV fluids as patient reports she is has not been hydrating well yesterday, update tetanus.  Will consult plastics for laceration repair.  Will reassess.

## 2025-01-24 NOTE — ED ADULT NURSE NOTE - NSFALLHARMRISKINTERV_ED_ALL_ED
Assistance OOB with selected safe patient handling equipment if applicable/Assistance with ambulation/Communicate risk of Fall with Harm to all staff, patient, and family/Orthostatic vital signs/Provide patient with walking aids/Provide visual cue: red socks, yellow wristband, yellow gown, etc/Reinforce activity limits and safety measures with patient and family/Use of alarms - bed, stretcher, chair and/or video monitoring/Bed in lowest position, wheels locked, appropriate side rails in place/Call bell, personal items and telephone in reach/Instruct patient to call for assistance before getting out of bed/chair/stretcher/Non-slip footwear applied when patient is off stretcher/[place] to call system/Physically safe environment - no spills, clutter or unnecessary equipment/Purposeful Proactive Rounding/Room/bathroom lighting operational, light cord in reach

## 2025-01-24 NOTE — ED PROVIDER NOTE - PROGRESS NOTE DETAILS
monitor shows afib with RVR 150s. Cardizem 10mg ordrered transient response to 1st dose of cardizem  Pt also reports feeling very anxious. anxiolytics were offered but pt declined earlier, now is amenable to getting them. Denies CP or SOB. Rpt dose of cardizem 10mg IVP ordered and ativan 0.5 mg. IVFs in progress, pt has only received 200cc   Case was d/w Dr. Mckeon for eyelid lac repair. Will come in later this morning to for repair  Cardio consult requested for afib RVR.  Labs reviewed, no electrolyte abnormality  CTs neg for acute injury transient response to 1st dose of cardizem  Pt also reports feeling very anxious. anxiolytics were offered but pt declined earlier, now is amenable to getting them. Denies CP or SOB. Rpt dose of cardizem 10mg IVP ordered and ativan 0.5 mg. IVFs in progress, pt has only received 200cc   Case was d/w Dr. Mckeon for eyelid lac repair. Will come in later this morning to for repair  Cardio consult requested for afib RVR.  Labs reviewed, no electrolyte abnormality  CTs neg for acute injury  Pt informed of all results and updated on Cardio and Plastics eval (pt aware that plastics repair of lac may incur additional charges not covered by insurance) Pt's HR improved after 2nd cardizem  HR variable between 90s-120  rpt trop ordered  Pt signed out to Dr. Vazquez to follow Patient's heart rate now in the mid 80s remains in A-fib/flutter.  No ST elevations or depressions.  Cardiology attending consult pending.  Plastics consult pending.  Patient to be admitted to the hospital for the new onset A-fib. Patient's heart rate now in the mid 80s remains in A-fib/flutter.  No ST elevations or depressions.  Cardiology attending consult pending.  Plastics consult pending.  Patient to be admitted to the hospital for the new onset A-fib.  seen by cards to be admitted for new onset afib. hospitalist paged.

## 2025-01-24 NOTE — H&P ADULT - PROBLEM SELECTOR PLAN 3
Chornic  - c/w home singulair 10 mg nightly  - c/w home Asmonex inhaler 1 puff at night  - Has home albuterol rescue inhaler. PRN Xopenex for SOB or wheezing.

## 2025-01-24 NOTE — CONSULT NOTE ADULT - ATTENDING COMMENTS
69yr old F with PMHx of asthma and skin cancer presents to ED for syncope overnight. Pt admitted for afib with RVR.     no prior cardiac hx  syncope overnight of uncertain etiology  found in rapid af, without sxs  unclear onset and overall burden of arrhythmia  rate control, can cont dilt for now  should start ac after wound sutured  echo  serial ce  tele   pending overall burden of af and tte results would likely favor longer term ac though this can be determined over time

## 2025-01-24 NOTE — H&P ADULT - NSHPPHYSICALEXAM_GEN_ALL_CORE
T(C): 36.7 (01-24-25 @ 12:04), Max: 36.8 (01-24-25 @ 07:51)  HR: 100 (01-24-25 @ 12:04) (92 - 166)  BP: 116/67 (01-24-25 @ 12:04) (100/69 - 142/77)  RR: 18 (01-24-25 @ 12:04) (16 - 22)  SpO2: 100% (01-24-25 @ 12:04) (97% - 100%)    CONSTITUTIONAL: Well groomed, no apparent distress  EYES: PERRLA and symmetric, EOMI, No conjunctival or scleral injection, non-icteric  ENMT: Oral mucosa with moist membranes. Normal dentition; no pharyngeal injection or exudates             NECK: Supple, symmetric and without tracheal deviation   RESP: No respiratory distress, no use of accessory muscles; CTA b/l, no WRR  CV: RRR, +S1S2, no MRG; no JVD; no peripheral edema  GI: Soft, NT, ND, no rebound, no guarding; no palpable masses; no hepatosplenomegaly; no hernia palpated  LYMPH: No cervical LAD or tenderness; no axillary LAD or tenderness; no inguinal LAD or tenderness  MSK: Normal gait; No digital clubbing or cyanosis; examination of the (head/neck/spine/ribs/pelvis, RUE, LUE, RLE, LLE) without misalignment,            Normal ROM without pain, no spinal tenderness, normal muscle strength/tone  SKIN: No rashes or ulcers noted; no subcutaneous nodules or induration palpable  NEURO: CN II-XII intact; normal reflexes in upper and lower extremities, sensation intact in upper and lower extremities b/l to light touch   PSYCH: Appropriate insight/judgment; A+O x 3, mood and affect appropriate, recent/remote memory intact T(C): 36.7 (01-24-25 @ 12:04), Max: 36.8 (01-24-25 @ 07:51)  HR: 100 (01-24-25 @ 12:04) (92 - 166)  BP: 116/67 (01-24-25 @ 12:04) (100/69 - 142/77)  RR: 18 (01-24-25 @ 12:04) (16 - 22)  SpO2: 100% (01-24-25 @ 12:04) (97% - 100%)    CONSTITUTIONAL: Well groomed, no apparent distress  EYES: PERRLA and symmetric, EOMI, No conjunctival or scleral injection, non-icteric. Laceration to the right eyelid. Ecchymosis surround the right eye.  ENMT: Oral mucosa with moist membranes. Normal dentition; no pharyngeal injection or exudates  NECK: Supple, symmetric and without tracheal deviation   RESP: No respiratory distress, no use of accessory muscles; CTA b/l, no WRR  CV: Irregular rate and rhythmn. +S1S2, no MRG; no JVD; no peripheral edema  GI: Soft, NT, ND, no rebound, no guarding; no palpable masses; no hepatosplenomegaly; no hernia palpated  LYMPH: No cervical LAD or tenderness;  MSK: Normal gait; No digital clubbing or cyanosis; Normal ROM without pain, no spinal tenderness, normal muscle strength/tone  SKIN: No rashes or ulcers noted; no subcutaneous nodules or induration palpable  NEURO: CN II-XII intact; normal reflexes in upper and lower extremities, sensation intact in upper and lower extremities b/l to light touch   PSYCH: Appropriate insight/judgment; A+O x 3, mood and affect appropriate, recent/remote memory intact

## 2025-01-24 NOTE — ED PROVIDER NOTE - OBJECTIVE STATEMENT
69-year-old female with history of asthma presents complaining of fall and right eyelid laceration.  Patient states she got up during the night to go to the bathroom.  Patient states she was keeping her eyes closed as she did not want to become fully awake during the process.  States after she got out of the bathroom she was walking back towards her bed and believes she syncopized and then fell.  Denies chest pain, palpitations, dizziness lightheadedness.  Patient states she feels thirsty thinks that she has not hydrated well during the day yesterday but she was busy as her daughter just had a baby.  Denies neck pain, abdominal pain, back pain, extremity pain.  Tetanus is not up-to-date.  No anticoagulants.

## 2025-01-24 NOTE — ED PROVIDER NOTE - CARE PLAN
1 Principal Discharge DX:	Syncope  Secondary Diagnosis:	Atrial fibrillation  Secondary Diagnosis:	Head injury  Secondary Diagnosis:	Eyelid laceration

## 2025-01-24 NOTE — ED PROVIDER NOTE - NS_BEDUNITTYPES_ED_ALL_ED
TELEMETRY Hydroxyzine Pregnancy And Lactation Text: This medication is not safe during pregnancy and should not be taken. It is also excreted in breast milk and breast feeding isn't recommended.

## 2025-01-24 NOTE — ED ADULT NURSE NOTE - OBJECTIVE STATEMENT
Received pt in room 10A, 69 yr/o female A+OX4, ambulatory with standby assistance at this time due to fall risk. hx of white coat syndrome. pt was brought inot the ED by EMS from home for syncopal event. states she woke up to use the bathroom and synopsized hitting her head on the baseboard. laceration noted to right eye brow and eye lid. bleeding controlled at this time. pt denies blood thinner use. pt was noted to be in rapid afib. meds given as ordered. PERRLA and facial symmetry noted. pt denies numbness, tingling, and weakness in peripheral extremities. flat, soft, nontender; denies nausea, vomiting, diarrhea, and constipation. pt has active and passive ROM of all extremities, no obvious joint deformities noted. left Ac 22g placed. pending plastic and cardiology consult.

## 2025-01-24 NOTE — H&P ADULT - ATTENDING COMMENTS
70 yo F with PMH Asthma and Skin cancer presents to the ED with Syncope and right eyelid laceration admitted for new Atrial fibrillation with RVR.    HPI as above.     T(C): 36.7 (01-24-25 @ 14:00), Max: 36.8 (01-24-25 @ 07:51)  HR: 85 (01-24-25 @ 14:00) (85 - 166)  BP: 103/75 (01-24-25 @ 14:00) (100/69 - 142/77)  RR: 18 (01-24-25 @ 14:00) (16 - 22)  SpO2: 96% (01-24-25 @ 14:00) (96% - 100%)  Wt(kg): --    Physical Exam:   GENERAL: well-groomed, well-developed, NAD  HEENT: head NC;, righ teylid laceration s/p suturing, EOM intact, PERRLA, conjunctiva & sclera clear; hearing grossly intact, moist mucous membranes  NECK: supple, no JVD  RESPIRATORY: CTA B/L, no wheezing, rales, rhonchi or rubs  CARDIOVASCULAR: S1&S2, irreg irreg, no murmur  ABDOMEN: soft, non-tender, non-distended, + Bowel sounds x4 quadrants, no guarding, rebound or rigidity  MUSCULOSKELETAL:  no clubbing, cyanosis or edema of all 4 extremities  LYMPH: no cervical lymphadenopathy  VASCULAR: Radial pulses 2+ bilaterally, no varicose veins   SKIN: warm and dry, color normal  NEUROLOGIC: AA&O X3, CN2-12 intact w/ no focal deficits, no sensory loss, motor Strength 5/5 in UE & LE B/L  Psych: Normal mood and affect, normal behavior      Plan:  Start heparin gtt this evening if OK with plastics  wean off cardizem gtt when able  monitor for further arrythmia  -currently rate controlle  -f/u TTE  TSH is WNL  dvt ppx: heparin drip when able. SCD's for now

## 2025-01-24 NOTE — ED ADULT NURSE REASSESSMENT NOTE - NS ED NURSE REASSESS COMMENT FT1
Contacted Dr JUWAN Biran regarding cardizem drip order, he states that she can be titrated down to 5 mg/hr at this time due to rate control in the 70s. Escalated concern for ICU consult as pt is on titration drip, order to be changed at this time.

## 2025-01-24 NOTE — PATIENT PROFILE ADULT - FALL HARM RISK - HARM RISK INTERVENTIONS
Assistance with ambulation/Assistance OOB with selected safe patient handling equipment/Communicate Risk of Fall with Harm to all staff/Monitor gait and stability/Reinforce activity limits and safety measures with patient and family/Review medications for side effects contributing to fall risk/Sit up slowly, dangle for a short time, stand at bedside before walking/Tailored Fall Risk Interventions/Toileting schedule using arm’s reach rule for commode and bathroom/Use of alarms - bed, chair and/or voice tab/Visual Cue: Yellow wristband and red socks/Bed in lowest position, wheels locked, appropriate side rails in place/Call bell, personal items and telephone in reach/Instruct patient to call for assistance before getting out of bed or chair/Non-slip footwear when patient is out of bed/[place] to call system/Physically safe environment - no spills, clutter or unnecessary equipment/Purposeful Proactive Rounding/Room/bathroom lighting operational, light cord in reach Assistance with ambulation/Assistance OOB with selected safe patient handling equipment/Communicate Risk of Fall with Harm to all staff/Monitor for mental status changes/Monitor gait and stability/Reinforce activity limits and safety measures with patient and family/Review medications for side effects contributing to fall risk/Sit up slowly, dangle for a short time, stand at bedside before walking/Tailored Fall Risk Interventions/Toileting schedule using arm’s reach rule for commode and bathroom/Use of alarms - bed, chair and/or voice tab/Visual Cue: Yellow wristband and red socks/Bed in lowest position, wheels locked, appropriate side rails in place/Call bell, personal items and telephone in reach/Instruct patient to call for assistance before getting out of bed or chair/Non-slip footwear when patient is out of bed/[place] to call system/Physically safe environment - no spills, clutter or unnecessary equipment/Purposeful Proactive Rounding/Room/bathroom lighting operational, light cord in reach

## 2025-01-24 NOTE — CAREGIVER ENGAGEMENT NOTE - CAREGIVER EDUCATION - TYPES DISCUSSED
Discharge plan Partial Purse String (Intermediate) Text: Given the location of the defect and the characteristics of the surrounding skin an intermediate purse string closure was deemed most appropriate.  Undermining was performed circumfirentially around the surgical defect.  A purse string suture was then placed and tightened. Wound tension only allowed a partial closure of the circular defect.

## 2025-01-24 NOTE — H&P ADULT - PROBLEM SELECTOR PLAN 2
Patient presenting with episode of syncope last night with head strike to the right eyelid. Now with R eyelid laceration and ecchymosis surrounding the R eye  - CT HEAD: No acute intracranial hemorrhage, mass effect, or midline shift. Calvarium intact.  - CT MAXILLOFACIAL: No acute fracture. No orbital injury.  - CT CERVICAL SPINE: No acute fracture or traumatic subluxation. Multi-level degenerative changes.  - Laceration now sutured by plastics  - in no acute pain at the moment   - Pupils equal and reactive b/l. No visual changes  - Plastic Surgery (Dr. Mckeon) consulted, f/u recs Patient presenting with episode of syncope last night with head strike to the right eyelid. Now with R eyelid laceration and ecchymosis surrounding the R eye  - CT HEAD: No acute intracranial hemorrhage, mass effect, or midline shift. Calvarium intact.  - CT MAXILLOFACIAL: No acute fracture. No orbital injury.  - CT CERVICAL SPINE: No acute fracture or traumatic subluxation. Multi-level degenerative changes.  - Received TDAP vaccine in the ED  - Laceration now sutured by plastics  - in no acute pain at the moment   - Pupils equal and reactive b/l. No visual changes  - Plastic Surgery (Dr. Mckeon) consulted, f/u recs

## 2025-01-24 NOTE — H&P ADULT - NSHPREVIEWOFSYSTEMS_GEN_ALL_CORE
REVIEW OF SYSTEMS:    CONSTITUTIONAL: No weakness, fevers or chills  HEENT:  No headache, no visual changes, no sore throat, neck supple  RESPIRATORY: No cough, wheezing, hemoptysis; No shortness of breath  CARDIOVASCULAR: No chest pain or palpitations  GASTROINTESTINAL: No abdominal pain, no nausea, vomiting, or hematemesis; No diarrhea or constipation. No melena or hematochezia.  GENITOURINARY: No dysuria, frequency or hematuria  NEUROLOGICAL: No focal weakness or dizziness   MSK: No myalgias  SKIN: No itching, burning, rashes, or lesions REVIEW OF SYSTEMS:    CONSTITUTIONAL: No weakness, fevers or chills  HEENT:  Right eyelid laceration. No headache, no visual changes, no sore throat, neck supple  RESPIRATORY: No cough, wheezing, hemoptysis; No shortness of breath  CARDIOVASCULAR: No chest pain or palpitations  GASTROINTESTINAL: No abdominal pain, no nausea, vomiting, or hematemesis; No diarrhea or constipation. No melena or hematochezia.  GENITOURINARY: No dysuria, frequency or hematuria  NEUROLOGICAL: No focal weakness or dizziness   MSK: No myalgias  SKIN: No itching, burning, rashes, or lesions

## 2025-01-24 NOTE — CARE COORDINATION ASSESSMENT. - OTHER PERTINENT REFERRAL INFORMATION
Sw met with Pt and spouse at bedside. Pt is A & O x4 and able to make her needs known. Sw introduced self and role and Pt and spouse expressed verbal understanding. Pt lives in a pvt home with spouse for past 43 years. Pt has 4 KAREN and 12 to 2nd fl and basement but bedroom is on the main level. Pt states that they were Indep w/ ADLS PTA. Pt has no hx of HC, DME or MARTA. Pt would like to return home upon DC. PCP Zachery [place].

## 2025-01-25 ENCOUNTER — RESULT REVIEW (OUTPATIENT)
Age: 70
End: 2025-01-25

## 2025-01-25 ENCOUNTER — TRANSCRIPTION ENCOUNTER (OUTPATIENT)
Age: 70
End: 2025-01-25

## 2025-01-25 LAB
ALBUMIN SERPL ELPH-MCNC: 3.4 G/DL — SIGNIFICANT CHANGE UP (ref 3.3–5)
ALP SERPL-CCNC: 77 U/L — SIGNIFICANT CHANGE UP (ref 40–120)
ALT FLD-CCNC: 21 U/L — SIGNIFICANT CHANGE UP (ref 12–78)
ANION GAP SERPL CALC-SCNC: 12 MMOL/L — SIGNIFICANT CHANGE UP (ref 5–17)
APTT BLD: 83.6 SEC — HIGH (ref 24.5–35.6)
APTT BLD: 97 SEC — HIGH (ref 24.5–35.6)
AST SERPL-CCNC: 17 U/L — SIGNIFICANT CHANGE UP (ref 15–37)
BASOPHILS # BLD AUTO: 0.03 K/UL — SIGNIFICANT CHANGE UP (ref 0–0.2)
BASOPHILS NFR BLD AUTO: 0.4 % — SIGNIFICANT CHANGE UP (ref 0–2)
BILIRUB SERPL-MCNC: 0.7 MG/DL — SIGNIFICANT CHANGE UP (ref 0.2–1.2)
BUN SERPL-MCNC: 9 MG/DL — SIGNIFICANT CHANGE UP (ref 7–23)
CALCIUM SERPL-MCNC: 8.9 MG/DL — SIGNIFICANT CHANGE UP (ref 8.5–10.1)
CHLORIDE SERPL-SCNC: 96 MMOL/L — SIGNIFICANT CHANGE UP (ref 96–108)
CO2 SERPL-SCNC: 25 MMOL/L — SIGNIFICANT CHANGE UP (ref 22–31)
CREAT SERPL-MCNC: 0.68 MG/DL — SIGNIFICANT CHANGE UP (ref 0.5–1.3)
EGFR: 94 ML/MIN/1.73M2 — SIGNIFICANT CHANGE UP
EOSINOPHIL # BLD AUTO: 0.14 K/UL — SIGNIFICANT CHANGE UP (ref 0–0.5)
EOSINOPHIL NFR BLD AUTO: 2 % — SIGNIFICANT CHANGE UP (ref 0–6)
GLUCOSE SERPL-MCNC: 92 MG/DL — SIGNIFICANT CHANGE UP (ref 70–99)
HCT VFR BLD CALC: 39.6 % — SIGNIFICANT CHANGE UP (ref 34.5–45)
HCT VFR BLD CALC: 40.2 % — SIGNIFICANT CHANGE UP (ref 34.5–45)
HGB BLD-MCNC: 13.9 G/DL — SIGNIFICANT CHANGE UP (ref 11.5–15.5)
HGB BLD-MCNC: 14.1 G/DL — SIGNIFICANT CHANGE UP (ref 11.5–15.5)
IMM GRANULOCYTES NFR BLD AUTO: 0.4 % — SIGNIFICANT CHANGE UP (ref 0–0.9)
LYMPHOCYTES # BLD AUTO: 1.22 K/UL — SIGNIFICANT CHANGE UP (ref 1–3.3)
LYMPHOCYTES # BLD AUTO: 17.3 % — SIGNIFICANT CHANGE UP (ref 13–44)
MAGNESIUM SERPL-MCNC: 2.1 MG/DL — SIGNIFICANT CHANGE UP (ref 1.6–2.6)
MCHC RBC-ENTMCNC: 30.8 PG — SIGNIFICANT CHANGE UP (ref 27–34)
MCHC RBC-ENTMCNC: 31 PG — SIGNIFICANT CHANGE UP (ref 27–34)
MCHC RBC-ENTMCNC: 35.1 G/DL — SIGNIFICANT CHANGE UP (ref 32–36)
MCHC RBC-ENTMCNC: 35.1 G/DL — SIGNIFICANT CHANGE UP (ref 32–36)
MCV RBC AUTO: 87.8 FL — SIGNIFICANT CHANGE UP (ref 80–100)
MCV RBC AUTO: 88.4 FL — SIGNIFICANT CHANGE UP (ref 80–100)
MONOCYTES # BLD AUTO: 0.66 K/UL — SIGNIFICANT CHANGE UP (ref 0–0.9)
MONOCYTES NFR BLD AUTO: 9.4 % — SIGNIFICANT CHANGE UP (ref 2–14)
NEUTROPHILS # BLD AUTO: 4.96 K/UL — SIGNIFICANT CHANGE UP (ref 1.8–7.4)
NEUTROPHILS NFR BLD AUTO: 70.5 % — SIGNIFICANT CHANGE UP (ref 43–77)
NRBC # BLD: 0 /100 WBCS — SIGNIFICANT CHANGE UP (ref 0–0)
NRBC # BLD: 0 /100 WBCS — SIGNIFICANT CHANGE UP (ref 0–0)
NRBC BLD-RTO: 0 /100 WBCS — SIGNIFICANT CHANGE UP (ref 0–0)
NRBC BLD-RTO: 0 /100 WBCS — SIGNIFICANT CHANGE UP (ref 0–0)
PHOSPHATE SERPL-MCNC: 3.4 MG/DL — SIGNIFICANT CHANGE UP (ref 2.5–4.5)
PLATELET # BLD AUTO: 276 K/UL — SIGNIFICANT CHANGE UP (ref 150–400)
PLATELET # BLD AUTO: 301 K/UL — SIGNIFICANT CHANGE UP (ref 150–400)
POTASSIUM SERPL-MCNC: 3.1 MMOL/L — LOW (ref 3.5–5.3)
POTASSIUM SERPL-SCNC: 3.1 MMOL/L — LOW (ref 3.5–5.3)
PROT SERPL-MCNC: 7 G/DL — SIGNIFICANT CHANGE UP (ref 6–8.3)
RBC # BLD: 4.51 M/UL — SIGNIFICANT CHANGE UP (ref 3.8–5.2)
RBC # BLD: 4.55 M/UL — SIGNIFICANT CHANGE UP (ref 3.8–5.2)
RBC # FLD: 12.7 % — SIGNIFICANT CHANGE UP (ref 10.3–14.5)
RBC # FLD: 12.8 % — SIGNIFICANT CHANGE UP (ref 10.3–14.5)
SODIUM SERPL-SCNC: 133 MMOL/L — LOW (ref 135–145)
WBC # BLD: 6.95 K/UL — SIGNIFICANT CHANGE UP (ref 3.8–10.5)
WBC # BLD: 7.04 K/UL — SIGNIFICANT CHANGE UP (ref 3.8–10.5)
WBC # FLD AUTO: 6.95 K/UL — SIGNIFICANT CHANGE UP (ref 3.8–10.5)
WBC # FLD AUTO: 7.04 K/UL — SIGNIFICANT CHANGE UP (ref 3.8–10.5)

## 2025-01-25 PROCEDURE — 99233 SBSQ HOSP IP/OBS HIGH 50: CPT

## 2025-01-25 PROCEDURE — 93010 ELECTROCARDIOGRAM REPORT: CPT

## 2025-01-25 PROCEDURE — 93306 TTE W/DOPPLER COMPLETE: CPT | Mod: 26

## 2025-01-25 PROCEDURE — 99232 SBSQ HOSP IP/OBS MODERATE 35: CPT

## 2025-01-25 RX ORDER — APIXABAN 5 MG/1
1 TABLET, FILM COATED ORAL
Qty: 60 | Refills: 0
Start: 2025-01-25 | End: 2025-02-23

## 2025-01-25 RX ORDER — METOPROLOL SUCCINATE 25 MG
25 TABLET, EXTENDED RELEASE 24 HR ORAL DAILY
Refills: 0 | Status: DISCONTINUED | OUTPATIENT
Start: 2025-01-25 | End: 2025-01-26

## 2025-01-25 RX ORDER — POTASSIUM CHLORIDE 750 MG/1
40 TABLET, EXTENDED RELEASE ORAL EVERY 4 HOURS
Refills: 0 | Status: COMPLETED | OUTPATIENT
Start: 2025-01-25 | End: 2025-01-25

## 2025-01-25 RX ORDER — APIXABAN 5 MG/1
5 TABLET, FILM COATED ORAL
Refills: 0 | Status: DISCONTINUED | OUTPATIENT
Start: 2025-01-25 | End: 2025-01-26

## 2025-01-25 RX ORDER — METOPROLOL SUCCINATE 25 MG
1 TABLET, EXTENDED RELEASE 24 HR ORAL
Qty: 0 | Refills: 0 | DISCHARGE
Start: 2025-01-25

## 2025-01-25 RX ORDER — ERYTHROMYCIN BASE 5 MG/GRAM
1 OINTMENT (GRAM) OPHTHALMIC (EYE)
Refills: 0 | Status: DISCONTINUED | OUTPATIENT
Start: 2025-01-25 | End: 2025-01-26

## 2025-01-25 RX ORDER — ERYTHROMYCIN BASE 5 MG/GRAM
1 OINTMENT (GRAM) OPHTHALMIC (EYE)
Qty: 0 | Refills: 0 | DISCHARGE
Start: 2025-01-25

## 2025-01-25 RX ADMIN — DILTIAZEM HYDROCHLORIDE 5 MG/HR: 60 TABLET ORAL at 00:07

## 2025-01-25 RX ADMIN — APIXABAN 5 MILLIGRAM(S): 5 TABLET, FILM COATED ORAL at 21:19

## 2025-01-25 RX ADMIN — Medication 1000 UNIT(S)/HR: at 09:14

## 2025-01-25 RX ADMIN — Medication 1000 UNIT(S)/HR: at 01:30

## 2025-01-25 RX ADMIN — MONTELUKAST SODIUM 10 MILLIGRAM(S): 5 TABLET, CHEWABLE ORAL at 21:17

## 2025-01-25 RX ADMIN — Medication 25 MILLIGRAM(S): at 12:09

## 2025-01-25 RX ADMIN — Medication 1 APPLICATION(S): at 18:47

## 2025-01-25 RX ADMIN — POTASSIUM CHLORIDE 40 MILLIEQUIVALENT(S): 750 TABLET, EXTENDED RELEASE ORAL at 18:44

## 2025-01-25 RX ADMIN — MOMETASONE FUROATE 1 PUFF(S): 220 INHALANT RESPIRATORY (INHALATION) at 21:17

## 2025-01-25 RX ADMIN — APIXABAN 5 MILLIGRAM(S): 5 TABLET, FILM COATED ORAL at 12:08

## 2025-01-25 RX ADMIN — POTASSIUM CHLORIDE 40 MILLIEQUIVALENT(S): 750 TABLET, EXTENDED RELEASE ORAL at 15:44

## 2025-01-25 RX ADMIN — Medication 1000 UNIT(S)/HR: at 07:38

## 2025-01-25 NOTE — DISCHARGE NOTE PROVIDER - CARE PROVIDER_API CALL
Indra Sawant  Interventional Cardiology  [address] [place] [address]  Phone: [phone]  Fax: [phone]  Follow Up Time:     Bud Mckeon  Plastic Surgery  [address] [place] [address], [address]  [place] [address]  Phone: [phone]  Fax: [phone]  Follow Up Time:

## 2025-01-25 NOTE — DISCHARGE NOTE PROVIDER - NSDCCPTREATMENT_GEN_ALL_CORE_FT
PRINCIPAL PROCEDURE  Procedure: CT scan  Findings and Treatment: IMPRESSION:  CT HEAD:  No acute intracranial hemorrhage, mass effect, or midline shift.   Calvarium intact.  CT MAXILLOFACIAL:  No acute fracture. No orbital injury.  CT CERVICAL SPINE:  No acute fracture or traumatic subluxation.  Multi-level degenerative changes.       PRINCIPAL PROCEDURE  Procedure: CT scan  Findings and Treatment: IMPRESSION:  CT HEAD:  No acute intracranial hemorrhage, mass effect, or midline shift.   Calvarium intact.  CT MAXILLOFACIAL:  No acute fracture. No orbital injury.  CT CERVICAL SPINE:  No acute fracture or traumatic subluxation.  Multi-level degenerative changes.        SECONDARY PROCEDURE  Procedure: Complete transthoracic echocardiography (TTE)  Findings and Treatment:   CONCLUSIONS:      1. Left ventricular cavity is normal in size. Left ventricular systolic function is normal with an ejection fraction of 63 % by Ferro's method of disks.   2. Normal right ventricular cavity size and normal right ventricular systolic function.   3. Aortic valve anatomy cannot be determined with normal systolic excursion.   4. Mild mitral regurgitation.   5. Mild tricuspid regurgitation.   6. There is mild calcification of the mitral valve annulus.   7. The inferior vena cava is normal in size measuring 1.91 cm in diameter, (normal <2.1cm) with normal inspiratory collapse (normal >50%) consistent with normal right atrial pressure (~3, range 0-5mmHg).

## 2025-01-25 NOTE — DISCHARGE NOTE PROVIDER - HOSPITAL COURSE
HPI:  70 yo F with PMH Asthma and Skin cancer presents to the ED with Syncope and right eyelid laceration. Pt reports yesterday she went to the bathroom in the middle of the night and on the way back to the bed, she fainted. She does not recall experiencing symptoms prior such as dizziness, lightheadedness, nausea, SOB, chest pain. She awoke within 30 seconds of fainting to her  helping her up off the floor.  states she was on the floor for less than 30 seconds and when he got to her she had already come to. Pt states she was very dehydrated yesterday and was very busy as her daughter just had a baby. Pt reports she had two similar episodes in the past with last one being 2 years ago over the summer. Reports in both situations she was dehydrated on those days. She denies any history of heart disease or arrhythmia in the past.    Denies fever, chills, chest pain, palpitations, SOB, cough, abdominal pain, nausea, vomiting, diarrhea, constipation, urinary frequency, urgency, or dysuria, headaches, changes in vision, dizziness, numbness, tingling.  Denies recent travel, recent antibiotic use, or sick contacts.    ED Course:   Vitals: BP: 142/77 , HR: 136, Temp: 97.3 F, RR: 18, SpO2: 98% on RA  Labs:  Trop 5.0, ProBNP 446, TSH 1.22, Na 132, K 3.5  CXR: Clear Lungs  CT HEAD: No acute intracranial hemorrhage, mass effect, or midline shift. Calvarium intact.  CT MAXILLOFACIAL: No acute fracture. No orbital injury.  CT CERVICAL SPINE: No acute fracture or traumatic subluxation. Multi-level degenerative changes.  EKG: Atrial fibrillation with rapid ventricular response Left axis deviation Minimal voltage criteria for LVH, may be normal variant ( Baldo product ) Septal infarct , age undetermined.  1L NS Bolus x1, Diltiazem 10 mg IV push x2, Diltiazem 15 mg IV push x1, Diltiazem Drip initiated, Lorazepam 0.5 mg IV x1, Ofirmev x1, TDaP vaccine Received in the ED    (24 Jan 2025 12:05)      ---  HOSPITAL COURSE: Patient admitted to medicine floor for management of     Pt seen and examined on day of discharge. Patient is medically optimized for discharge to home with close outpatient followup.    PHYSICAL EXAM ON DAY OF DISCHARGE:  The patient was seen and examined on the day of discharge. Please see progress note from day of discharge for further information.         ---  CONSULTANTS:     ---  TIME SPENT:  I, the attending physician, was physically present for the key portions of the evaluation and management (E/M) service provided. The total amount of time spent reviewing the hospital notes, laboratory values, imaging findings, assessing/counseling the patient, discussing with consultant physicians, social work, nursing staff was -- minutes    ---  Primary care provider was made aware of plan for discharge:      [  ] NO     [  ] YES   HPI:  68 yo F with PMH Asthma and Skin cancer presents to the ED with Syncope and right eyelid laceration. Pt reports yesterday she went to the bathroom in the middle of the night and on the way back to the bed, she fainted. She does not recall experiencing symptoms prior such as dizziness, lightheadedness, nausea, SOB, chest pain. She awoke within 30 seconds of fainting to her  helping her up off the floor.  states she was on the floor for less than 30 seconds and when he got to her she had already come to. Pt states she was very dehydrated yesterday and was very busy as her daughter just had a baby. Pt reports she had two similar episodes in the past with last one being 2 years ago over the summer. Reports in both situations she was dehydrated on those days. She denies any history of heart disease or arrhythmia in the past.    Denies fever, chills, chest pain, palpitations, SOB, cough, abdominal pain, nausea, vomiting, diarrhea, constipation, urinary frequency, urgency, or dysuria, headaches, changes in vision, dizziness, numbness, tingling.  Denies recent travel, recent antibiotic use, or sick contacts.    ED Course:   Vitals: BP: 142/77 , HR: 136, Temp: 97.3 F, RR: 18, SpO2: 98% on RA  Labs:  Trop 5.0, ProBNP 446, TSH 1.22, Na 132, K 3.5  CXR: Clear Lungs  CT HEAD: No acute intracranial hemorrhage, mass effect, or midline shift. Calvarium intact.  CT MAXILLOFACIAL: No acute fracture. No orbital injury.  CT CERVICAL SPINE: No acute fracture or traumatic subluxation. Multi-level degenerative changes.  EKG: Atrial fibrillation with rapid ventricular response Left axis deviation Minimal voltage criteria for LVH, may be normal variant ( Baldo product ) Septal infarct , age undetermined.  1L NS Bolus x1, Diltiazem 10 mg IV push x2, Diltiazem 15 mg IV push x1, Diltiazem Drip initiated, Lorazepam 0.5 mg IV x1, Ofirmev x1, TDaP vaccine Received in the ED    (24 Jan 2025 12:05)      ---  HOSPITAL COURSE: Patient admitted to medicine floor for management of rapid afib. She was continued on dilt and hep gtt. She converted to sinus rhythm. Pt transitioned to eliquis and toprol.   No further telemetry events   TTE reviewed by cardiology- grossly normal.   Per cardiology pt optimized for d/c home with outpatient follow up.       For eyebrown/eyelid lac pt was seen by plastic surgery who repaired laceration. She is to follow up outpatient with Dr. Mckeon.     Pt seen and examined on day of discharge. Patient is medically optimized for discharge to home with close outpatient followup.    PHYSICAL EXAM ON DAY OF DISCHARGE:  Vital Signs Last 24 Hrs  T(C): 37.1 (25 Jan 2025 08:44), Max: 37.1 (25 Jan 2025 08:44)  T(F): 98.7 (25 Jan 2025 08:44), Max: 98.7 (25 Jan 2025 08:44)  HR: 71 (25 Jan 2025 12:00) (67 - 101)  BP: 146/74 (25 Jan 2025 12:00) (103/75 - 146/74)  BP(mean): 85 (24 Jan 2025 13:00) (85 - 85)  RR: 17 (25 Jan 2025 08:44) (16 - 18)  SpO2: 95% (25 Jan 2025 08:44) (95% - 98%)    Parameters below as of 25 Jan 2025 08:44  Patient On (Oxygen Delivery Method): room air        CONSTITUTIONAL: Well groomed, no apparent distress  EYES: PERRLA and symmetric, EOMI, No conjunctival or scleral injection, non-icteric. Laceration to the right eyelid. Ecchymosis surround the right eye.  ENMT: left mandibular ecchymoses with mild ttp  NECK: Supple  RESP: No respiratory distress, no use of accessory muscles; CTA b/l, no WRR  CV: RRR, no m/r/g  GI: Soft, NT, ND, no rebound, no guarding  PSYCH: Appropriate insight/judgment; A+O x 3, mood and affect appropriate, recent/remote memory intact      ---  CONSULTANTS:   Cardiology- Dr. Sawant   Plastic Surgery- Dr. Mckeon   HPI:  70 yo F with PMH Asthma and Skin cancer presents to the ED with Syncope and right eyelid laceration. Pt reports yesterday she went to the bathroom in the middle of the night and on the way back to the bed, she fainted. She does not recall experiencing symptoms prior such as dizziness, lightheadedness, nausea, SOB, chest pain. She awoke within 30 seconds of fainting to her  helping her up off the floor.  states she was on the floor for less than 30 seconds and when he got to her she had already come to. Pt states she was very dehydrated yesterday and was very busy as her daughter just had a baby. Pt reports she had two similar episodes in the past with last one being 2 years ago over the summer. Reports in both situations she was dehydrated on those days. She denies any history of heart disease or arrhythmia in the past.    Denies fever, chills, chest pain, palpitations, SOB, cough, abdominal pain, nausea, vomiting, diarrhea, constipation, urinary frequency, urgency, or dysuria, headaches, changes in vision, dizziness, numbness, tingling.  Denies recent travel, recent antibiotic use, or sick contacts.    ED Course:   Vitals: BP: 142/77 , HR: 136, Temp: 97.3 F, RR: 18, SpO2: 98% on RA  Labs:  Trop 5.0, ProBNP 446, TSH 1.22, Na 132, K 3.5  CXR: Clear Lungs  CT HEAD: No acute intracranial hemorrhage, mass effect, or midline shift. Calvarium intact.  CT MAXILLOFACIAL: No acute fracture. No orbital injury.  CT CERVICAL SPINE: No acute fracture or traumatic subluxation. Multi-level degenerative changes.  EKG: Atrial fibrillation with rapid ventricular response Left axis deviation Minimal voltage criteria for LVH, may be normal variant ( Baldo product ) Septal infarct , age undetermined.  1L NS Bolus x1, Diltiazem 10 mg IV push x2, Diltiazem 15 mg IV push x1, Diltiazem Drip initiated, Lorazepam 0.5 mg IV x1, Ofirmev x1, TDaP vaccine Received in the ED    (24 Jan 2025 12:05)      ---  HOSPITAL COURSE: Patient admitted to medicine floor for management of rapid afib. She was continued on dilt and hep gtt. She converted to sinus rhythm. Pt transitioned to eliquis and toprol.   No further telemetry events   TTE reviewed by cardiology- showed LVEF of 63% with mild MR and TR. No significant sighs of valvular diseases noted.   Per cardiology pt optimized for d/c home with outpatient follow up.       For eyebrow/eyelid lac pt was seen by plastic surgery who repaired laceration. She is to follow up outpatient with Dr. Mckeon.     Pt seen and examined on day of discharge. Patient is medically optimized for discharge to home with close outpatient followup.    PHYSICAL EXAM ON DAY OF DISCHARGE:  Vital Signs Last 24 Hrs  T(C): 37.1 (25 Jan 2025 08:44), Max: 37.1 (25 Jan 2025 08:44)  T(F): 98.7 (25 Jan 2025 08:44), Max: 98.7 (25 Jan 2025 08:44)  HR: 71 (25 Jan 2025 12:00) (67 - 101)  BP: 146/74 (25 Jan 2025 12:00) (103/75 - 146/74)  BP(mean): 85 (24 Jan 2025 13:00) (85 - 85)  RR: 17 (25 Jan 2025 08:44) (16 - 18)  SpO2: 95% (25 Jan 2025 08:44) (95% - 98%)    Parameters below as of 25 Jan 2025 08:44  Patient On (Oxygen Delivery Method): room air        CONSTITUTIONAL: Well groomed, no apparent distress  EYES: PERRLA and symmetric, EOMI, No conjunctival or scleral injection, non-icteric. Laceration to the right eyelid. Ecchymosis surround the right eye.  ENMT: left mandibular ecchymoses with mild ttp  NECK: Supple  RESP: No respiratory distress, no use of accessory muscles; CTA b/l, no WRR  CV: RRR, no m/r/g  GI: Soft, NT, ND, no rebound, no guarding  PSYCH: Appropriate insight/judgment; A+O x 3, mood and affect appropriate, recent/remote memory intact      ---  CONSULTANTS:   Cardiology- Dr. Sawant   Plastic Surgery- Dr. Mckeon

## 2025-01-25 NOTE — PROGRESS NOTE ADULT - NS ATTEND AMEND GEN_ALL_CORE FT
69yr old F with PMHx of asthma and skin cancer presents to ED for syncope overnight.     New onset Afib with RVR.   - p/f syncope sustaining eyelid lac, s/p repaired by plastics     - initial EKG in ED: Afib with RVR, left axis deviation, septal infarct, age undetermined , follow up repeat EKG to confirm SR   - No clear evidence of acute ischemia  - Troponin negative x 1    - s/p Cardizem 10mg x2 in ED   - continue Cardizem gtt 10mg/hr  - no prior hx of Afib   - telemetry reviewed: SR/ST  since transferred to tele unit @ approx 1900 , no further episode of Afib noted   - BP stable and controlled   - currently on Cardizem gtt and hep gtt would dc   - start metoprolol ER 25 mg PO daily (ordered)   - start Eliquis 5mg PO BID (ordered)  - Monitor and replete Lytes. Keep K > 4 and Mg > 2    - pending TTE, no prior  study  - no signs of fluid overload on exam    - will follow up with me

## 2025-01-25 NOTE — DISCHARGE NOTE PROVIDER - ATTENDING DISCHARGE PHYSICAL EXAMINATION:
Vital Signs Last 24 Hrs  T(C): 37.1 (25 Jan 2025 08:44), Max: 37.1 (25 Jan 2025 08:44)  T(F): 98.7 (25 Jan 2025 08:44), Max: 98.7 (25 Jan 2025 08:44)  HR: 71 (25 Jan 2025 12:00) (67 - 101)  BP: 146/74 (25 Jan 2025 12:00) (103/75 - 146/74)  BP(mean): 85 (24 Jan 2025 13:00) (85 - 85)  RR: 17 (25 Jan 2025 08:44) (16 - 18)  SpO2: 95% (25 Jan 2025 08:44) (95% - 98%)    Parameters below as of 25 Jan 2025 08:44  Patient On (Oxygen Delivery Method): room air        CONSTITUTIONAL: Well groomed, no apparent distress  EYES: PERRLA and symmetric, EOMI, No conjunctival or scleral injection, non-icteric. Laceration to the right eyelid. Ecchymosis surround the right eye.  ENMT: left mandibular ecchymoses with mild ttp  NECK: Supple  RESP: No respiratory distress, no use of accessory muscles; CTA b/l, no WRR  CV: RRR, no m/r/g  GI: Soft, NT, ND, no rebound, no guarding  PSYCH: Appropriate insight/judgment; A+O x 3, mood and affect appropriate, recent/remote memory intact   Vital Signs Last 24 Hrs  T(C): 37 (26 Jan 2025 05:00), Max: 37 (26 Jan 2025 05:00)  T(F): 98.6 (26 Jan 2025 05:00), Max: 98.6 (26 Jan 2025 05:00)  HR: 70 (26 Jan 2025 05:00) (64 - 71)  BP: 137/73 (26 Jan 2025 05:00) (137/73 - 157/84)  BP(mean): --  RR: 17 (26 Jan 2025 05:00) (17 - 17)  SpO2: 94% (26 Jan 2025 05:00) (94% - 95%)    Parameters below as of 26 Jan 2025 05:00  Patient On (Oxygen Delivery Method): room air          CONSTITUTIONAL: Well groomed, no apparent distress  EYES: PERRLA and symmetric, EOMI, No conjunctival or scleral injection, non-icteric. Laceration to the right eyelid. Ecchymosis surround the right eye.  ENMT: left mandibular ecchymoses with mild ttp  NECK: Supple  RESP: No respiratory distress, no use of accessory muscles; CTA b/l, no WRR  CV: RRR, no m/r/g  GI: Soft, NT, ND, no rebound, no guarding  PSYCH: Appropriate insight/judgment; A+O x 3, mood and affect appropriate, recent/remote memory intact

## 2025-01-25 NOTE — PROGRESS NOTE ADULT - ASSESSMENT
70 yo F with PMH Asthma and Skin cancer presents to the ED with Syncope and right eyelid laceration admitted for new Atrial fibrillation with RVR.      #Afib RVR  - admitted with new onset afib rvr, started on hep gtt and dilt gtt   - c/w Diltiazem drip with plan to transition to oral agent  - converted to sinus rhythm   - transition from hep gtt to eliquis. will arrange meds to beds   - CHADVASC score of 2.  - f/u TTE  - monitor on tele  - Monitor and replete lytes, keep K>4, Mg>2.  - Cardiology consulted (Dr. Sawant) f/u recs.    #Syncope   #eyelid laceration  - 2/2 dehydration vs afib rvr  -  imaging reviewed. no fracture. +laceration on eye lid sutured  - continue to monitor on tele . check tte  - check orthostatic vs   - eyelid lac s/p repair with plastics Dr. Mckeon. awaiting further recs for follow up  - s/p TDAP in ER  - f/u with plastics as outpatient     #Asthma  - continue home singulair, asmonex, albuterol     #ppx  - vte ppx on eliquis    Dispo: d/c planning for 1/26

## 2025-01-25 NOTE — DISCHARGE NOTE PROVIDER - CARE PROVIDERS DIRECT ADDRESSES
[email or web address]

## 2025-01-25 NOTE — DISCHARGE NOTE PROVIDER - NSDCMRMEDTOKEN_GEN_ALL_CORE_FT
Albuterol (Eqv-ProAir HFA) 90 mcg/inh inhalation aerosol: 2 puff(s) inhaled once a day as needed for  shortness of breath and/or wheezing  Asmanex Twisthaler 120 Dose 220 mcg/inh inhalation aerosol powder: 1 puff(s) inhaled once a day (at bedtime)  Singulair 10 mg oral tablet: 1 tab(s) orally once a day (at bedtime)   Albuterol (Eqv-ProAir HFA) 90 mcg/inh inhalation aerosol: 2 puff(s) inhaled once a day as needed for  shortness of breath and/or wheezing  apixaban 5 mg oral tablet: 1 tab(s) orally 2 times a day  Asmanex Twisthaler 120 Dose 220 mcg/inh inhalation aerosol powder: 1 puff(s) inhaled once a day (at bedtime)  Eyemycin 0.5% ophthalmic ointment: 1 application to each affected eye 2 times a day  metoprolol succinate 25 mg oral tablet, extended release: 1 tab(s) orally once a day  Singulair 10 mg oral tablet: 1 tab(s) orally once a day (at bedtime)   Albuterol (Eqv-ProAir HFA) 90 mcg/inh inhalation aerosol: 2 puff(s) inhaled once a day as needed for  shortness of breath and/or wheezing  apixaban 5 mg oral tablet: 1 tab(s) orally 2 times a day  Asmanex Twisthaler 120 Dose 220 mcg/inh inhalation aerosol powder: 1 puff(s) inhaled once a day (at bedtime)  Eyemycin 0.5% ophthalmic ointment: 1 application to each affected eye 2 times a day Please apply topically to right eye laceration twice daily.  metoprolol succinate 25 mg oral tablet, extended release: 1 tab(s) orally once a day  Singulair 10 mg oral tablet: 1 tab(s) orally once a day (at bedtime)

## 2025-01-25 NOTE — PROGRESS NOTE ADULT - TIME BILLING
Note written by attending, see above.  Time spent: 52min. More than 50% of the visit was spent counseling the patient on medical condition and coordination of care

## 2025-01-25 NOTE — PROGRESS NOTE ADULT - ASSESSMENT
69yr old F with PMHx of asthma and skin cancer presents to ED for syncope overnight.     New onset Afib with RVR.   - p/f syncope sustaining eyelid lac, s/p repaired by plastics     - initial EKG in ED: Afib with RVR, left axis deviation, septal infarct, age undetermined , follow up repeat EKG to confirm SR   - No clear evidence of acute ischemia  - Troponin negative x 1    - s/p Cardizem 10mg x2 in ED   - continue Cardizem gtt 10mg/hr  - no prior hx of Afib   - telemetry reviewed: SR/ST  since transferred to tele unit @ approx 1900 , no further episode of Afib noted   - BP stable and controlled   - currently on Cardizem gtt would and hep gtt dc   - start metoprolol ER 25 mg PO daily (ordered)   - start Eliquis 5mg PO BID (ordered)  - Monitor and replete Lytes. Keep K > 4 and Mg > 2    - pending TTE, no prior  study  - no signs of fluid overload on exam    - will follow up with Dr. Sawant post discharge for further cardiac care   - Will continue to follow.    Tatyana Roe [facility]  Nurse Practitioner - Cardiology   call TEAMS   69yr old F with PMHx of asthma and skin cancer presents to ED for syncope overnight.     New onset Afib with RVR.   - p/f syncope sustaining eyelid lac, s/p repaired by plastics     - initial EKG in ED: Afib with RVR, left axis deviation, septal infarct, age undetermined , follow up repeat EKG to confirm SR   - No clear evidence of acute ischemia  - Troponin negative x 1    - s/p Cardizem 10mg x2 in ED   - continue Cardizem gtt 10mg/hr  - no prior hx of Afib   - telemetry reviewed: SR/ST  since transferred to tele unit @ approx 1900 , no further episode of Afib noted   - BP stable and controlled   - currently on Cardizem gtt and hep gtt would dc   - start metoprolol ER 25 mg PO daily (ordered)   - start Eliquis 5mg PO BID (ordered)  - Monitor and replete Lytes. Keep K > 4 and Mg > 2    - pending TTE, no prior  study  - no signs of fluid overload on exam    - will follow up with Dr. Sawant post discharge for further cardiac care   - Will continue to follow.    Tatyana Roe [facility]  Nurse Practitioner - Cardiology   call TEAMS

## 2025-01-25 NOTE — DISCHARGE NOTE PROVIDER - NSDCCPCAREPLAN_GEN_ALL_CORE_FT
PRINCIPAL DISCHARGE DIAGNOSIS  Diagnosis: Syncope  Assessment and Plan of Treatment: You presented to the hospital after syncopizing (passing out). This was likely due to dehydration. You were also found to be in atrial fibrillation at that time. Please stay hydrated. Follow up with your pcp within 1-2 weeks of discharge.      SECONDARY DISCHARGE DIAGNOSES  Diagnosis: Atrial fibrillation  Assessment and Plan of Treatment: You were found to have an arrythmia. Atrial fibrillation with rapid ventricular response. You were given medications to slow your heart rate and a blood thinner. Atrial fibrillation puts you at higher risk for stroke which is why you were started on a blood thinner. Please continue eliquis (blood thinner) as directed. You were also started on metoprolol. This medication is used to control the heart rate. Please continue Toprol. Follow up with cardiology upon discharge from the hospital  You had an echocardiogram performed while in the hospital. A preliminary read by the cardiologist was normal but please follow up with cardiology or call medical records to obtain the final report.    Diagnosis: Head injury  Assessment and Plan of Treatment:     Diagnosis: Eyelid laceration  Assessment and Plan of Treatment: When you fell you sustained an injury to your eyelid/brow. This was repaired by plastic surgery. Please follow up with Dr. Mckeon as directed. Contact information is included in this document.    Diagnosis: Electrolyte abnormality  Assessment and Plan of Treatment: You were dehydrated when you came to the hospital. This has improved, however at time of discharge your sodium and potassium are still mildly abnormal. You were given supplementation. Please follow up with your primary doctor for repeat labs as an outpatient   Your last potassium was 3.1   Your last sodium was 133     PRINCIPAL DISCHARGE DIAGNOSIS  Diagnosis: Syncope  Assessment and Plan of Treatment: You presented to the hospital after syncopizing (passing out). This was likely due to dehydration. You were also found to be in atrial fibrillation at that time. Please stay hydrated. Follow up with your pcp within 1-2 weeks of discharge.      SECONDARY DISCHARGE DIAGNOSES  Diagnosis: Atrial fibrillation  Assessment and Plan of Treatment: You were found to have an arrythmia. Atrial fibrillation with rapid ventricular response. You were given medications to slow your heart rate and a blood thinner. Atrial fibrillation puts you at higher risk for stroke which is why you were started on a blood thinner. Please continue eliquis (blood thinner) as directed. You were also started on metoprolol. This medication is used to control the heart rate. Please continue Toprol. Follow up with cardiology upon discharge from the hospital  You had an echocardiogram performed while in the hospital. A preliminary read by the cardiologist was normal but please follow up with cardiology or call medical records to obtain the final report.    Diagnosis: Head injury  Assessment and Plan of Treatment:     Diagnosis: Eyelid laceration  Assessment and Plan of Treatment: When you fell you sustained an injury to your eyelid/brow. This was repaired by plastic surgery. Continue erythromycin. Please follow up with Dr. Mckeon as directed- call office monday. Contact information is included in this document.

## 2025-01-26 VITALS
RESPIRATION RATE: 17 BRPM | DIASTOLIC BLOOD PRESSURE: 84 MMHG | OXYGEN SATURATION: 96 % | SYSTOLIC BLOOD PRESSURE: 158 MMHG | TEMPERATURE: 97 F | HEART RATE: 70 BPM

## 2025-01-26 LAB
ALBUMIN SERPL ELPH-MCNC: 3.7 G/DL — SIGNIFICANT CHANGE UP (ref 3.3–5)
ALP SERPL-CCNC: 74 U/L — SIGNIFICANT CHANGE UP (ref 40–120)
ALT FLD-CCNC: 20 U/L — SIGNIFICANT CHANGE UP (ref 12–78)
ANION GAP SERPL CALC-SCNC: 9 MMOL/L — SIGNIFICANT CHANGE UP (ref 5–17)
APTT BLD: 34.2 SEC — SIGNIFICANT CHANGE UP (ref 24.5–35.6)
AST SERPL-CCNC: 15 U/L — SIGNIFICANT CHANGE UP (ref 15–37)
BASOPHILS # BLD AUTO: 0.04 K/UL — SIGNIFICANT CHANGE UP (ref 0–0.2)
BASOPHILS NFR BLD AUTO: 0.8 % — SIGNIFICANT CHANGE UP (ref 0–2)
BILIRUB SERPL-MCNC: 0.6 MG/DL — SIGNIFICANT CHANGE UP (ref 0.2–1.2)
BUN SERPL-MCNC: 10 MG/DL — SIGNIFICANT CHANGE UP (ref 7–23)
CALCIUM SERPL-MCNC: 8.7 MG/DL — SIGNIFICANT CHANGE UP (ref 8.5–10.1)
CHLORIDE SERPL-SCNC: 99 MMOL/L — SIGNIFICANT CHANGE UP (ref 96–108)
CHOLEST SERPL-MCNC: 204 MG/DL — HIGH
CO2 SERPL-SCNC: 27 MMOL/L — SIGNIFICANT CHANGE UP (ref 22–31)
CREAT SERPL-MCNC: 0.55 MG/DL — SIGNIFICANT CHANGE UP (ref 0.5–1.3)
EGFR: 99 ML/MIN/1.73M2 — SIGNIFICANT CHANGE UP
EOSINOPHIL # BLD AUTO: 0.25 K/UL — SIGNIFICANT CHANGE UP (ref 0–0.5)
EOSINOPHIL NFR BLD AUTO: 5 % — SIGNIFICANT CHANGE UP (ref 0–6)
GLUCOSE SERPL-MCNC: 100 MG/DL — HIGH (ref 70–99)
HCT VFR BLD CALC: 42.5 % — SIGNIFICANT CHANGE UP (ref 34.5–45)
HDLC SERPL-MCNC: 62 MG/DL — SIGNIFICANT CHANGE UP
HGB BLD-MCNC: 14.6 G/DL — SIGNIFICANT CHANGE UP (ref 11.5–15.5)
IMM GRANULOCYTES NFR BLD AUTO: 0.4 % — SIGNIFICANT CHANGE UP (ref 0–0.9)
LIPID PNL WITH DIRECT LDL SERPL: 129 MG/DL — HIGH
LYMPHOCYTES # BLD AUTO: 1.38 K/UL — SIGNIFICANT CHANGE UP (ref 1–3.3)
LYMPHOCYTES # BLD AUTO: 27.8 % — SIGNIFICANT CHANGE UP (ref 13–44)
MAGNESIUM SERPL-MCNC: 2.4 MG/DL — SIGNIFICANT CHANGE UP (ref 1.6–2.6)
MCHC RBC-ENTMCNC: 30.9 PG — SIGNIFICANT CHANGE UP (ref 27–34)
MCHC RBC-ENTMCNC: 34.4 G/DL — SIGNIFICANT CHANGE UP (ref 32–36)
MCV RBC AUTO: 89.9 FL — SIGNIFICANT CHANGE UP (ref 80–100)
MONOCYTES # BLD AUTO: 0.54 K/UL — SIGNIFICANT CHANGE UP (ref 0–0.9)
MONOCYTES NFR BLD AUTO: 10.9 % — SIGNIFICANT CHANGE UP (ref 2–14)
NEUTROPHILS # BLD AUTO: 2.74 K/UL — SIGNIFICANT CHANGE UP (ref 1.8–7.4)
NEUTROPHILS NFR BLD AUTO: 55.1 % — SIGNIFICANT CHANGE UP (ref 43–77)
NON HDL CHOLESTEROL: 142 MG/DL — HIGH
NRBC # BLD: 0 /100 WBCS — SIGNIFICANT CHANGE UP (ref 0–0)
NRBC BLD-RTO: 0 /100 WBCS — SIGNIFICANT CHANGE UP (ref 0–0)
PHOSPHATE SERPL-MCNC: 3.5 MG/DL — SIGNIFICANT CHANGE UP (ref 2.5–4.5)
PLATELET # BLD AUTO: 308 K/UL — SIGNIFICANT CHANGE UP (ref 150–400)
POTASSIUM SERPL-MCNC: 4 MMOL/L — SIGNIFICANT CHANGE UP (ref 3.5–5.3)
POTASSIUM SERPL-SCNC: 4 MMOL/L — SIGNIFICANT CHANGE UP (ref 3.5–5.3)
PROT SERPL-MCNC: 7.2 G/DL — SIGNIFICANT CHANGE UP (ref 6–8.3)
RBC # BLD: 4.73 M/UL — SIGNIFICANT CHANGE UP (ref 3.8–5.2)
RBC # FLD: 12.7 % — SIGNIFICANT CHANGE UP (ref 10.3–14.5)
SODIUM SERPL-SCNC: 135 MMOL/L — SIGNIFICANT CHANGE UP (ref 135–145)
TRIGL SERPL-MCNC: 69 MG/DL — SIGNIFICANT CHANGE UP
WBC # BLD: 4.97 K/UL — SIGNIFICANT CHANGE UP (ref 3.8–10.5)
WBC # FLD AUTO: 4.97 K/UL — SIGNIFICANT CHANGE UP (ref 3.8–10.5)

## 2025-01-26 PROCEDURE — 80053 COMPREHEN METABOLIC PANEL: CPT

## 2025-01-26 PROCEDURE — 93306 TTE W/DOPPLER COMPLETE: CPT

## 2025-01-26 PROCEDURE — 83880 ASSAY OF NATRIURETIC PEPTIDE: CPT

## 2025-01-26 PROCEDURE — 99239 HOSP IP/OBS DSCHRG MGMT >30: CPT

## 2025-01-26 PROCEDURE — 70450 CT HEAD/BRAIN W/O DYE: CPT | Mod: MC

## 2025-01-26 PROCEDURE — 80061 LIPID PANEL: CPT

## 2025-01-26 PROCEDURE — 85730 THROMBOPLASTIN TIME PARTIAL: CPT

## 2025-01-26 PROCEDURE — 72125 CT NECK SPINE W/O DYE: CPT | Mod: MC

## 2025-01-26 PROCEDURE — 93005 ELECTROCARDIOGRAM TRACING: CPT

## 2025-01-26 PROCEDURE — 84100 ASSAY OF PHOSPHORUS: CPT

## 2025-01-26 PROCEDURE — 94640 AIRWAY INHALATION TREATMENT: CPT

## 2025-01-26 PROCEDURE — 71045 X-RAY EXAM CHEST 1 VIEW: CPT

## 2025-01-26 PROCEDURE — 70486 CT MAXILLOFACIAL W/O DYE: CPT | Mod: MC

## 2025-01-26 PROCEDURE — 84484 ASSAY OF TROPONIN QUANT: CPT

## 2025-01-26 PROCEDURE — 99285 EMERGENCY DEPT VISIT HI MDM: CPT

## 2025-01-26 PROCEDURE — 96376 TX/PRO/DX INJ SAME DRUG ADON: CPT

## 2025-01-26 PROCEDURE — 85610 PROTHROMBIN TIME: CPT

## 2025-01-26 PROCEDURE — 90471 IMMUNIZATION ADMIN: CPT

## 2025-01-26 PROCEDURE — 90715 TDAP VACCINE 7 YRS/> IM: CPT

## 2025-01-26 PROCEDURE — 96375 TX/PRO/DX INJ NEW DRUG ADDON: CPT

## 2025-01-26 PROCEDURE — 96374 THER/PROPH/DIAG INJ IV PUSH: CPT

## 2025-01-26 PROCEDURE — 83735 ASSAY OF MAGNESIUM: CPT

## 2025-01-26 PROCEDURE — 85025 COMPLETE CBC W/AUTO DIFF WBC: CPT

## 2025-01-26 PROCEDURE — 99232 SBSQ HOSP IP/OBS MODERATE 35: CPT

## 2025-01-26 PROCEDURE — 85027 COMPLETE CBC AUTOMATED: CPT

## 2025-01-26 PROCEDURE — 84443 ASSAY THYROID STIM HORMONE: CPT

## 2025-01-26 PROCEDURE — 36415 COLL VENOUS BLD VENIPUNCTURE: CPT

## 2025-01-26 RX ORDER — METOPROLOL SUCCINATE 25 MG
1 TABLET, EXTENDED RELEASE 24 HR ORAL
Qty: 30 | Refills: 0
Start: 2025-01-26 | End: 2025-02-24

## 2025-01-26 RX ORDER — ERYTHROMYCIN BASE 5 MG/GRAM
1 OINTMENT (GRAM) OPHTHALMIC (EYE)
Qty: 2 | Refills: 0
Start: 2025-01-26 | End: 2025-02-01

## 2025-01-26 RX ADMIN — Medication 25 MILLIGRAM(S): at 05:47

## 2025-01-26 RX ADMIN — Medication 1 APPLICATION(S): at 05:47

## 2025-01-26 RX ADMIN — APIXABAN 5 MILLIGRAM(S): 5 TABLET, FILM COATED ORAL at 11:16

## 2025-01-26 NOTE — PROGRESS NOTE ADULT - ASSESSMENT
69yr old F with PMHx of asthma and skin cancer presents to ED for syncope overnight.     New onset Afib with RVR.   - p/f syncope sustaining eyelid lac, s/p repaired by plastics     - initial EKG in ED: Afib with RVR, left axis deviation, septal infarct, age undetermined   - repeat EKG: NSR   - No clear evidence of acute ischemia  - Troponin negative x 1  - s/p Cardizem IVP -> gtt now off  - no prior hx of Afib   - telemetry:  SR 60's   - BP stable and controlled   - continue metoprolol ER 25 mg PO daily  - continue Eliquis 5mg PO BID  - Monitor and replete Lytes. Keep K > 4 and Mg > 2    - TTE (1/25/25) LVSF normal EF 63%  - no signs of fluid overload on exam    - will follow up with Dr. Sawant post discharge for further cardiac care   - Will continue to follow.    JOSSY SimonJUWAN  Nurse Practitioner - Cardiology   call TEAMS

## 2025-01-26 NOTE — DISCHARGE NOTE NURSING/CASE MANAGEMENT/SOCIAL WORK - NSSCCONTNUM_GEN_ALL_CORE
[facility] care agency [phone] or [phone] will reach out to you within 24-72 hours of your discharge to schedule home care visit/eval appointment with you. Please call agency for any queries regarding home care services

## 2025-01-26 NOTE — DISCHARGE NOTE NURSING/CASE MANAGEMENT/SOCIAL WORK - NSDCVIVACCINE_GEN_ALL_CORE_FT
Tdap; 24-Jan-2025 05:29; Yola Paul (RN); [employer]; [id number] (Exp. Date: 05-May-2026); IntraMuscular; Deltoid Right.; 0.5 milliLiter(s); VIS (VIS Published: 09-May-2013, VIS Presented: 24-Jan-2025);

## 2025-01-26 NOTE — PROGRESS NOTE ADULT - NS ATTEND AMEND GEN_ALL_CORE FT
69yr old F with PMHx of asthma and skin cancer presents to ED for syncope overnight.      - p/f syncope sustaining eyelid lac, s/p repaired by plastics    - initial EKG in ED: Afib with RVR, left axis deviation, septal infarct, age undetermined   - repeat EKG: NSR   - No clear evidence of acute ischemia   - no prior hx of Afib    - BP stable and controlled   - continue metoprolol ER 25 mg PO daily  - continue Eliquis 5mg PO BID   - no signs of fluid overload on exam   - will follow up with me post discharge for further cardiac care

## 2025-01-26 NOTE — CASE MANAGEMENT PROGRESS NOTE - NSCMPROGRESSNOTE_GEN_ALL_CORE
Per MD, patient is medically cleared for discharge home today.  CM met with patient at bedside to discussed discharge disposition is home (patient declining home care - new Afib with Eliquis). Meds to bed delivered. Patient states she will see her cardiologist in 3 weeks for follow up.  to transport patient home. Patient verbalized understanding of the transition plan and is in agreement. CM answered all questions to the best of my abilities. CM remains available throughout hospital stay.

## 2025-01-26 NOTE — DISCHARGE NOTE NURSING/CASE MANAGEMENT/SOCIAL WORK - PATIENT PORTAL LINK FT
You can access the FollowMyHealth Patient Portal offered by [facility] by registering at the following website: [email or web address]. By joining [employer]’s FollowMyHealth portal, you will also be able to view your health information using other applications (apps) compatible with our system.

## 2025-01-26 NOTE — PROGRESS NOTE ADULT - SUBJECTIVE AND OBJECTIVE BOX
[facility] -- Jese Raymond Pannella, Patel, Savella, Goodger, Cohen  Office # [phone]    Follow Up:  New onset Afib     Subjective/Observations: seen and examined, awake, alert, resting comfortably in bed, denies chest pain, dyspnea, palpitations or dizziness, orthopnea and PND. Tolerating room air.     REVIEW OF SYSTEMS: All other review of systems is negative unless indicated above  PAST MEDICAL & SURGICAL HISTORY:  Asthma      History of skin cancer      S/P JULIETTE (total abdominal hysterectomy)        MEDICATIONS  (STANDING):  diltiazem Infusion 5 mG/Hr (5 mL/Hr) IV Continuous <Continuous>  heparin  Infusion.  Unit(s)/Hr (10 mL/Hr) IV Continuous <Continuous>  influenza  Vaccine (HIGH DOSE) 0.5 milliLiter(s) IntraMuscular once  mometasone 220 MICROgram(s) Inhaler 1 Puff(s) Inhalation at bedtime  montelukast 10 milliGRAM(s) Oral at bedtime    MEDICATIONS  (PRN):  heparin   Injectable 4000 Unit(s) IV Push every 6 hours PRN For aPTT less than 40  heparin   Injectable 2000 Unit(s) IV Push every 6 hours PRN For aPTT between 40 - 57  levalbuterol Inhalation 0.63 milliGRAM(s) Inhalation every 6 hours PRN shortness or breath and/or wheezing    Allergies    Soybean (Other)  No Known Drug Allergies    Intolerances      Vital Signs Last 24 Hrs  T(C): 37.1 (25 Jan 2025 08:44), Max: 37.1 (25 Jan 2025 08:44)  T(F): 98.7 (25 Jan 2025 08:44), Max: 98.7 (25 Jan 2025 08:44)  HR: 68 (25 Jan 2025 08:44) (67 - 107)  BP: 131/76 (25 Jan 2025 08:44) (103/75 - 131/76)  BP(mean): 85 (24 Jan 2025 13:00) (85 - 93)  RR: 17 (25 Jan 2025 08:44) (16 - 18)  SpO2: 95% (25 Jan 2025 08:44) (95% - 100%)    Parameters below as of 25 Jan 2025 08:44  Patient On (Oxygen Delivery Method): room air      I&O's Summary        TELE: SR 's   PHYSICAL EXAM:  Constitutional: NAD, awake and alert  HEENT: Moist Mucous Membranes, Anicteric. r eye lac   Pulmonary: Non-labored, breath sounds are clear bilaterally, No wheezing, rales or rhonchi  Cardiovascular:Regular, S1 and S2, No murmurs, rubs, gallops or clicks  Gastrointestinal: Bowel Sounds present, soft, nontender.   Lymph: No peripheral edema. No lymphadenopathy.  Skin: No visible rashes or ulcers.  Psych:  Mood & affect appropriate  LABS: All Labs Reviewed:                        14.1   7.04  )-----------( 276      ( 25 Jan 2025 07:37 )             40.2                         13.9   6.95  )-----------( 301      ( 25 Jan 2025 00:28 )             39.6                         16.7   5.08  )-----------( 291      ( 24 Jan 2025 05:20 )             47.3     25 Jan 2025 07:37    133    |  96     |  9      ----------------------------<  92     3.1     |  25     |  0.68   24 Jan 2025 05:20    132    |  98     |  8      ----------------------------<  136    3.5     |  29     |  0.76     Ca    8.9        25 Jan 2025 07:37  Ca    9.0        24 Jan 2025 05:20  Phos  3.4       25 Jan 2025 07:37  Mg     2.1       25 Jan 2025 07:37    TPro  7.0    /  Alb  3.4    /  TBili  0.7    /  DBili  x      /  AST  17     /  ALT  21     /  AlkPhos  77     25 Jan 2025 07:37  TPro  8.7    /  Alb  4.4    /  TBili  0.6    /  DBili  x      /  AST  30     /  ALT  28     /  AlkPhos  104    24 Jan 2025 05:20    PT/INR - ( 24 Jan 2025 10:02 )   PT: 10.9 sec;   INR: 0.92 ratio         PTT - ( 25 Jan 2025 08:01 )  PTT:83.6 sec      12 Lead ECG:   Ventricular Rate 135 BPM    QRS Duration 76 ms    Q-T Interval 314 ms    QTC Calculation(Bazett) 471 ms    R Axis -73 degrees    T Axis 96 degrees    Diagnosis Line Atrial fibrillation with rapid ventricular response  Left axis deviation  Minimal voltage criteria for LVH, may be normal variant ( [place] product )  Anteroseptal infarct (cited on or before 24-JAN-2025)  Abnormal ECG  When compared with ECG of 24-JAN-2025 05:02, (Unconfirmed)  No significant change was found  Confirmed by Indra Corral ([id number]) on 1/24/2025 4:34:47 PM (01-24-25 @ 08:32)       
[facility] -- Jese Raymond, Selvin Ramos Savella, Goodger, Cohen  Office # [phone]    Follow Up:  New onset Afib     Subjective/Observations: seen and examined, awake, alert, resting comfortably in bed, denies chest pain, dyspnea, palpitations or dizziness, orthopnea and PND. Tolerating room air. no events overnight     REVIEW OF SYSTEMS: All other review of systems is negative unless indicated above  PAST MEDICAL & SURGICAL HISTORY:  Asthma      History of skin cancer      S/P JULIETTE (total abdominal hysterectomy)        MEDICATIONS  (STANDING):  apixaban 5 milliGRAM(s) Oral <User Schedule>  erythromycin   Ointment 1 Application(s) Both EYES two times a day  influenza  Vaccine (HIGH DOSE) 0.5 milliLiter(s) IntraMuscular once  metoprolol succinate ER 25 milliGRAM(s) Oral daily  mometasone 220 MICROgram(s) Inhaler 1 Puff(s) Inhalation at bedtime  montelukast 10 milliGRAM(s) Oral at bedtime    MEDICATIONS  (PRN):  levalbuterol Inhalation 0.63 milliGRAM(s) Inhalation every 6 hours PRN shortness or breath and/or wheezing    Allergies    Soybean (Other)  No Known Drug Allergies    Intolerances      Vital Signs Last 24 Hrs  T(C): 37 (26 Jan 2025 05:00), Max: 37 (26 Jan 2025 05:00)  T(F): 98.6 (26 Jan 2025 05:00), Max: 98.6 (26 Jan 2025 05:00)  HR: 70 (26 Jan 2025 05:00) (64 - 71)  BP: 137/73 (26 Jan 2025 05:00) (137/73 - 157/84)  BP(mean): --  RR: 17 (26 Jan 2025 05:00) (17 - 17)  SpO2: 94% (26 Jan 2025 05:00) (94% - 95%)    Parameters below as of 26 Jan 2025 05:00  Patient On (Oxygen Delivery Method): room air      I&O's Summary      TELE: SR 60's    PHYSICAL EXAM:  Constitutional: NAD, awake and alert  HEENT: Moist Mucous Membranes, Anicteric. r eye lac   Pulmonary: Non-labored, breath sounds are clear bilaterally, No wheezing, rales or rhonchi  Cardiovascular:Regular, S1 and S2, No murmurs, rubs, gallops or clicks  Gastrointestinal: Bowel Sounds present, soft, nontender.   Lymph: No peripheral edema. No lymphadenopathy.  Skin: No visible rashes or ulcers.  Psych:  Mood & affect appropriate  LABS: All Labs Reviewed:                        14.6   4.97  )-----------( 308      ( 26 Jan 2025 07:50 )             42.5                         14.1   7.04  )-----------( 276      ( 25 Jan 2025 07:37 )             40.2                         13.9   6.95  )-----------( 301      ( 25 Jan 2025 00:28 )             39.6     26 Jan 2025 07:50    135    |  99     |  10     ----------------------------<  100    4.0     |  27     |  0.55   25 Jan 2025 07:37    133    |  96     |  9      ----------------------------<  92     3.1     |  25     |  0.68   24 Jan 2025 05:20    132    |  98     |  8      ----------------------------<  136    3.5     |  29     |  0.76     Ca    8.7        26 Jan 2025 07:50  Ca    8.9        25 Jan 2025 07:37  Ca    9.0        24 Jan 2025 05:20  Phos  3.5       26 Jan 2025 07:50  Phos  3.4       25 Jan 2025 07:37  Mg     2.4       26 Jan 2025 07:50  Mg     2.1       25 Jan 2025 07:37    TPro  7.2    /  Alb  3.7    /  TBili  0.6    /  DBili  x      /  AST  15     /  ALT  20     /  AlkPhos  74     26 Jan 2025 07:50  TPro  7.0    /  Alb  3.4    /  TBili  0.7    /  DBili  x      /  AST  17     /  ALT  21     /  AlkPhos  77     25 Jan 2025 07:37  TPro  8.7    /  Alb  4.4    /  TBili  0.6    /  DBili  x      /  AST  30     /  ALT  28     /  AlkPhos  104    24 Jan 2025 05:20    PTT - ( 26 Jan 2025 07:50 )  PTT:34.2 sec      12 Lead ECG:   Ventricular Rate 66 BPM    Atrial Rate 66 BPM    P-R Interval 170 ms    QRS Duration 78 ms    Q-T Interval 424 ms    QTC Calculation(Bazett) 444 ms    P Axis 73 degrees    R Axis -73 degrees    T Axis 56 degrees    Diagnosis Line Normal sinus rhythm  Possible Left atrial enlargement  Left axis deviation  Inferior infarct , age undetermined  Anteroseptal infarct (cited on or before 24-JAN-2025)  Abnormal ECG  When compared with ECG of 24-JAN-2025 08:32,  Sinus rhythm has replaced Atrial fibrillation  Vent. rate has decreased BY  69 BPM  Questionable change in initial forces of Septal leads  Nonspecific T wave abnormality now evident in Inferior leads  Confirmed by Indra Sawant MD ([id number]) on 1/25/2025 4:43:11 PM (01-25-25 @ 11:52)      TRANSTHORACIC ECHOCARDIOGRAM REPORT  ________________________________________________________________________________                                      _______       Pt. Name:       CLAYTON PRADHAN Study Date:    1/25/2025  MRN:            [id number]      YOB: 1955  Accession #:    [id number]     Age:           69 years  Account#:       [id number]    Gender:        F  Heart Rate:                   Height:        66.14 in (168.00 cm)  Rhythm:                       Weight:        116.84 lb (53.00 kg)  Blood Pressure: 146/74 mmHg   BSA/BMI:       1.59 m² / 18.78 kg/m²  ________________________________________________________________________________________  Referring Physician:    [id number] Nick Brian  Interpreting Physician: Alma King MD  Primary Sonographer:    Rafael Mccormack    CPT:               ECHO TTE WO CON COMP W DOPP - 39707.m  Indication(s):     Syncope and collapse - R55  Procedure:         Transthoracic echocardiogram with 2-D, M-mode and complete             spectral and color flow Doppler.  Ordering Location: [facility]  Admission Status:  Inpatient  Study Information: Image quality for this study is fair.    _______________________________________________________________________________________     CONCLUSIONS:      1. Left ventricular cavity is normal in size. Left ventricular systolic function is normal with an ejection fraction of 63 % by Ferro's method of disks.   2. Normal right ventricular cavity size and normal right ventricular systolic function.   3. Aortic valve anatomy cannot be determined with normal systolic excursion.   4. Mild mitral regurgitation.   5. Mild tricuspid regurgitation.   6. There is mild calcification of the mitral valve annulus.   7. The inferior vena cava is normal in size measuring 1.91 cm in diameter, (normal <2.1cm) with normal inspiratory collapse (normal >50%) consistent with normal right atrial pressure (~3, range 0-5mmHg).    ________________________________________________________________________________________  FINDINGS:     Left Ventricle:  The left ventricular cavity is normal in size. Left ventricular systolic function is normal with a calculated ejection fraction of 63 % by the Ferro's biplane method of disks.     Right Ventricle:  The right ventricular cavity is normal in size and right ventricular systolic function is normal. Tricuspid annular plane systolic excursion (TAPSE) is 2.2 cm (normal >=1.7 cm).     Left Atrium:  The left atrium is normal in size.     Right Atrium:  The right atrium is normal in size with an indexed volume of 17.06 ml/m².     Interatrial Septum:  The interatrial septum appears intact.     Aortic Valve:  The aortic valve anatomy cannot be determined with normal systolic excursion. There is no evidence of aortic regurgitation.     Mitral Valve:  Structurally normal mitral valve with normal leaflet excursion. There is mild calcification of the mitral valve annulus. There is mild mitral regurgitation.     Tricuspid Valve:  Structurally normal tricuspid valve with normal leaflet excursion. There is mild tricuspid regurgitation. Estimated pulmonary artery systolic pressure is 25 mmHg.     Pulmonic Valve:  The pulmonic valve was not well visualized.     Aorta:  The aortic root at the sinuses of Valsalva is normal in size, measuring 3.10 cm (indexed 1.94 cm/m²). The ascending aorta is normal in size, measuring 3.00 cm (indexed 1.88 cm/m²). The aortic arch diameter is normal in size, measuring 3.0 cm (indexed 1.88 cm/m²).     Pericardium:  No pericardial effusion seen.     Systemic Veins:  The inferior vena cava is normal in size measuring 1.91 cm in diameter, (normal <2.1cm) with normal inspiratory collapse (normal >50%) consistent with normal right atrial pressure (~3, range 0-5mmHg).  ____________________________________________________________________  QUANTITATIVE DATA:  Left Ventricle Measurements: (Indexed to BSA)     IVSd (2D):   0.8 cm  LVPWd (2D):  0.8 cm  LVIDd (2D):  3.5 cm  LVIDs (2D):  2.2 cm  LV Mass:     78 g   48.7 g/m²  LV Vol d, MOD A2C: 45.2 ml 28.35 ml/m²  LV Vol d, MOD A4C: 45.1 ml 28.29 ml/m²  LV Vol d, MOD BP:  45.9 ml 28.82 ml/m²  LV Vol s, MOD A2C: 14.3 ml 8.97 ml/m²  LV Vol s, MOD A4C: 15.1 ml 9.47 ml/m²  LV Vol s, MOD BP:  17.0 ml 10.64 ml/m²  LVOT SV MOD BP:    29.0 ml  LV EF% MOD BP:     63 %     MV E Vmax:    0.79 m/s  MV A Vmax:    1.00 m/s  MV E/A:       0.79  e' lateral:   10.30 cm/s  e' medial:    10.10 cm/s  E/e' lateral: 7.63  E/e' medial:  7.78  E/e' Average: 7.71  MV DT:        173 msec    Aorta Measurements: (Normal range) (Indexed to BSA)     Ao Root d     3.10 cm (2.7 - 3.3 cm) 1.94 cm/m²  Ao Asc d, 2D: 3.00  Ao Asc prox:  3.00 cm                1.88 cm/m²  Ao Arch:      3.0 cm            Left Atrium Measurements: (Indexed to BSA)  LADiam 2D: 2.10 cm         Right Ventricle Measurements: Right Atrial Measurements:     TAPSE:            2.2 cm      RA Vol:            27.20 ml  RV Base (RVID1):  2.7 cm      RA Vol s, MOD A4C  27.2 ml  RV Mid (RVID2):   2.0 cm      RA Vol Index: 17.06 ml/m²  RV Major (RVID3): 6.5 cm      RA Area s, MOD A4C 11.2 cm²       LVOT / RVOT/ Qp/Qs Data: (Indexed to BSA)  LVOT Diameter:  1.80 cm  LVOT Area:      2.54 cm²  LVOT Vmax:      0.91 m/s  LVOT Vmn:       0.582 m/s  LVOT VTI:       18.00 cm  LVOT peak grad: 3 mmHg  LVOT mean grad: 2.0 mmHg  LVOT SV:        45.8 ml   28.73 ml/m²    Aortic Valve Measurements:  AV Vmax:                1.1 m/s  AV Peak Gradient:       5.2 mmHg  AV Mean Gradient:       3.0 mmHg  AV VTI:                 24.2 cm  AV VTI Ratio:           0.74  AoV EOA, Contin:        1.89 cm²  AoV EOA, Contin i:      1.19 cm²/m²  AoV Dimensionless Index 0.74    Mitral Valve Measurements:     MV E Vmax: 0.8 m/s         MR Vmax:          1.92 m/s  MV A Vmax: 1.0 m/s         MRPeak Gradient: 14.7 mmHg  MV E/A:    0.8       Tricuspid Valve Measurements:     TR Vmean:         1.4 m/s  TR Vmax:          2.4 m/s  TR Peak Gradient: 22.3 mmHg  RA Pressure:      3 mmHg  PASP:             25 mmHg  TR VTI:           37.10 cm    ________________________________________________________________________________________  Electronically signed on 1/25/2025 at 5:08:32 PM by Alma King MD         *** Final ***     
Patient is a 69y old  Female who presents with a chief complaint of Syncope, Afib with RVR, Right eyelid laceration (25 Jan 2025 11:10)      Subjective:  INTERVAL HPI/OVERNIGHT EVENTS: Patient seen and examined at bedside. No overnight events occurred  Pt feeling improved today.   back in sinus rhythm    MEDICATIONS  (STANDING):  apixaban 5 milliGRAM(s) Oral <User Schedule>  influenza  Vaccine (HIGH DOSE) 0.5 milliLiter(s) IntraMuscular once  metoprolol succinate ER 25 milliGRAM(s) Oral daily  mometasone 220 MICROgram(s) Inhaler 1 Puff(s) Inhalation at bedtime  montelukast 10 milliGRAM(s) Oral at bedtime    MEDICATIONS  (PRN):  levalbuterol Inhalation 0.63 milliGRAM(s) Inhalation every 6 hours PRN shortness or breath and/or wheezing      Allergies    Soybean (Other)  No Known Drug Allergies    Intolerances        REVIEW OF SYSTEMS:  CONSTITUTIONAL: No fever or chills  HEENT:  No headache, no sore throat  RESPIRATORY: No cough, wheezing, or shortness of breath  CARDIOVASCULAR: No chest pain, palpitations  GASTROINTESTINAL: No abd pain, nausea, vomiting, or diarrhea  GENITOURINARY: No dysuria, frequency, or hematuria  NEUROLOGICAL: no focal weakness or dizziness  MUSCULOSKELETAL: no myalgias     Objective:  Vital Signs Last 24 Hrs  T(C): 37.1 (25 Jan 2025 08:44), Max: 37.1 (25 Jan 2025 08:44)  T(F): 98.7 (25 Jan 2025 08:44), Max: 98.7 (25 Jan 2025 08:44)  HR: 71 (25 Jan 2025 12:00) (67 - 101)  BP: 146/74 (25 Jan 2025 12:00) (103/75 - 146/74)  BP(mean): 85 (24 Jan 2025 13:00) (85 - 85)  RR: 17 (25 Jan 2025 08:44) (16 - 18)  SpO2: 95% (25 Jan 2025 08:44) (95% - 98%)    Parameters below as of 25 Jan 2025 08:44  Patient On (Oxygen Delivery Method): room air        CONSTITUTIONAL: Well groomed, no apparent distress  EYES: PERRLA and symmetric, EOMI, No conjunctival or scleral injection, non-icteric. Laceration to the right eyelid. Ecchymosis surround the right eye.  ENMT: left mandibular ecchymoses with mild ttp  NECK: Supple  RESP: No respiratory distress, no use of accessory muscles; CTA b/l, no WRR  CV: RRR, no m/r/g  GI: Soft, NT, ND, no rebound, no guarding  PSYCH: Appropriate insight/judgment; A+O x 3, mood and affect appropriate, recent/remote memory intact      LABS:                        14.1   7.04  )-----------( 276      ( 25 Jan 2025 07:37 )             40.2     CBC Full  -  ( 25 Jan 2025 07:37 )  WBC Count : 7.04 K/uL  Hemoglobin : 14.1 g/dL  Hematocrit : 40.2 %  Platelet Count - Automated : 276 K/uL  Mean Cell Volume : 88.4 fl  Mean Cell Hemoglobin : 31.0 pg  Mean Cell Hemoglobin Concentration : 35.1 g/dL  Auto Neutrophil # : 4.96 K/uL  Auto Lymphocyte # : 1.22 K/uL  Auto Monocyte # : 0.66 K/uL  Auto Eosinophil # : 0.14 K/uL  Auto Basophil # : 0.03 K/uL  Auto Neutrophil % : 70.5 %  Auto Lymphocyte % : 17.3 %  Auto Monocyte % : 9.4 %  Auto Eosinophil % : 2.0 %  Auto Basophil % : 0.4 %    25 Jan 2025 07:37    133    |  96     |  9      ----------------------------<  92     3.1     |  25     |  0.68     Ca    8.9        25 Jan 2025 07:37  Phos  3.4       25 Jan 2025 07:37  Mg     2.1       25 Jan 2025 07:37    TPro  7.0    /  Alb  3.4    /  TBili  0.7    /  DBili  x      /  AST  17     /  ALT  21     /  AlkPhos  77     25 Jan 2025 07:37    PT/INR - ( 24 Jan 2025 10:02 )   PT: 10.9 sec;   INR: 0.92 ratio         PTT - ( 25 Jan 2025 08:01 )  PTT:83.6 sec  Urinalysis Basic - ( 25 Jan 2025 07:37 )    Color: x / Appearance: x / SG: x / pH: x  Gluc: 92 mg/dL / Ketone: x  / Bili: x / Urobili: x   Blood: x / Protein: x / Nitrite: x   Leuk Esterase: x / RBC: x / WBC x   Sq Epi: x / Non Sq Epi: x / Bacteria: x      CAPILLARY BLOOD GLUCOSE              RADIOLOGY & ADDITIONAL TESTS:    Personally reviewed.     Consultant(s) Notes Reviewed:  [x] YES  [ ] NO

## 2025-01-26 NOTE — DISCHARGE NOTE NURSING/CASE MANAGEMENT/SOCIAL WORK - FINANCIAL ASSISTANCE
[facility] provides services at a reduced cost to those who are determined to be eligible through [facility]’s financial assistance program. Information regarding [facility]’s financial assistance program can be found by going to [email or web address] or by calling [phone].

## 2025-01-26 NOTE — PROGRESS NOTE ADULT - REASON FOR ADMISSION
Syncope, Afib with RVR, Right eyelid laceration

## 2025-01-26 NOTE — DISCHARGE NOTE NURSING/CASE MANAGEMENT/SOCIAL WORK - NSDCPEFALRISK_GEN_ALL_CORE
For information on Fall & Injury Prevention, visit: [email or web address] OR  [email or web address] OR  https://www.cdc.gov/steadi/patient.html

## 2025-02-10 ENCOUNTER — NON-APPOINTMENT (OUTPATIENT)
Age: 70
End: 2025-02-10

## 2025-02-11 ENCOUNTER — NON-APPOINTMENT (OUTPATIENT)
Age: 70
End: 2025-02-11

## 2025-02-11 ENCOUNTER — APPOINTMENT (OUTPATIENT)
Dept: CARDIOLOGY | Facility: CLINIC | Age: 70
End: 2025-02-11
Payer: MEDICARE

## 2025-02-11 VITALS
DIASTOLIC BLOOD PRESSURE: 93 MMHG | HEART RATE: 83 BPM | HEIGHT: 66 IN | SYSTOLIC BLOOD PRESSURE: 175 MMHG | OXYGEN SATURATION: 98 % | WEIGHT: 116 LBS | BODY MASS INDEX: 18.64 KG/M2

## 2025-02-11 DIAGNOSIS — R55 SYNCOPE AND COLLAPSE: ICD-10-CM

## 2025-02-11 DIAGNOSIS — I48.0 PAROXYSMAL ATRIAL FIBRILLATION: ICD-10-CM

## 2025-02-11 PROBLEM — Z85.828 PERSONAL HISTORY OF OTHER MALIGNANT NEOPLASM OF SKIN: Chronic | Status: ACTIVE | Noted: 2025-01-24

## 2025-02-11 PROBLEM — J45.909 UNSPECIFIED ASTHMA, UNCOMPLICATED: Chronic | Status: ACTIVE | Noted: 2025-01-24

## 2025-02-11 PROCEDURE — 99215 OFFICE O/P EST HI 40 MIN: CPT

## 2025-02-11 PROCEDURE — 93000 ELECTROCARDIOGRAM COMPLETE: CPT

## 2025-02-11 RX ORDER — APIXABAN 5 MG/1
5 TABLET, FILM COATED ORAL
Qty: 90 | Refills: 3 | Status: ACTIVE | COMMUNITY

## 2025-02-12 ENCOUNTER — TRANSCRIPTION ENCOUNTER (OUTPATIENT)
Age: 70
End: 2025-02-12

## 2025-02-12 ENCOUNTER — NON-APPOINTMENT (OUTPATIENT)
Age: 70
End: 2025-02-12

## 2025-02-13 ENCOUNTER — TRANSCRIPTION ENCOUNTER (OUTPATIENT)
Age: 70
End: 2025-02-13

## 2025-02-13 RX ORDER — METOPROLOL SUCCINATE 25 MG/1
25 TABLET, EXTENDED RELEASE ORAL
Qty: 90 | Refills: 3 | Status: ACTIVE | COMMUNITY

## 2025-03-17 ENCOUNTER — TRANSCRIPTION ENCOUNTER (OUTPATIENT)
Age: 70
End: 2025-03-17

## 2025-04-30 ENCOUNTER — TRANSCRIPTION ENCOUNTER (OUTPATIENT)
Age: 70
End: 2025-04-30

## 2025-05-01 ENCOUNTER — TRANSCRIPTION ENCOUNTER (OUTPATIENT)
Age: 70
End: 2025-05-01

## 2025-05-29 ENCOUNTER — NON-APPOINTMENT (OUTPATIENT)
Age: 70
End: 2025-05-29

## 2025-05-29 ENCOUNTER — APPOINTMENT (OUTPATIENT)
Dept: CARDIOLOGY | Facility: CLINIC | Age: 70
End: 2025-05-29
Payer: MEDICARE

## 2025-05-29 VITALS
HEART RATE: 94 BPM | OXYGEN SATURATION: 99 % | BODY MASS INDEX: 18.64 KG/M2 | WEIGHT: 116 LBS | SYSTOLIC BLOOD PRESSURE: 185 MMHG | HEIGHT: 66 IN | DIASTOLIC BLOOD PRESSURE: 81 MMHG

## 2025-05-29 DIAGNOSIS — E78.5 HYPERLIPIDEMIA, UNSPECIFIED: ICD-10-CM

## 2025-05-29 DIAGNOSIS — I48.0 PAROXYSMAL ATRIAL FIBRILLATION: ICD-10-CM

## 2025-05-29 PROCEDURE — 93000 ELECTROCARDIOGRAM COMPLETE: CPT

## 2025-05-29 PROCEDURE — 99214 OFFICE O/P EST MOD 30 MIN: CPT

## 2025-06-12 ENCOUNTER — TRANSCRIPTION ENCOUNTER (OUTPATIENT)
Age: 70
End: 2025-06-12

## 2025-07-03 ENCOUNTER — TRANSCRIPTION ENCOUNTER (OUTPATIENT)
Age: 70
End: 2025-07-03

## 2025-07-16 ENCOUNTER — APPOINTMENT (OUTPATIENT)
Dept: INTERNAL MEDICINE | Facility: CLINIC | Age: 70
End: 2025-07-16
Payer: MEDICARE

## 2025-07-16 VITALS
BODY MASS INDEX: 18.64 KG/M2 | WEIGHT: 116 LBS | OXYGEN SATURATION: 98 % | HEART RATE: 90 BPM | TEMPERATURE: 98.7 F | HEIGHT: 66 IN | SYSTOLIC BLOOD PRESSURE: 168 MMHG | DIASTOLIC BLOOD PRESSURE: 76 MMHG

## 2025-07-16 PROCEDURE — G0439: CPT

## 2025-07-16 PROCEDURE — 36415 COLL VENOUS BLD VENIPUNCTURE: CPT

## 2025-07-18 ENCOUNTER — TRANSCRIPTION ENCOUNTER (OUTPATIENT)
Age: 70
End: 2025-07-18

## 2025-07-18 LAB
25(OH)D3 SERPL-MCNC: 47.5 NG/ML
ALBUMIN SERPL ELPH-MCNC: 4.9 G/DL
ALP BLD-CCNC: 96 U/L
ALT SERPL-CCNC: 16 U/L
ANION GAP SERPL CALC-SCNC: 15 MMOL/L
APPEARANCE: CLEAR
AST SERPL-CCNC: 22 U/L
BACTERIA: NEGATIVE /HPF
BASOPHILS # BLD AUTO: 0.08 K/UL
BASOPHILS NFR BLD AUTO: 1 %
BILIRUB SERPL-MCNC: 0.3 MG/DL
BILIRUBIN URINE: NEGATIVE
BLOOD URINE: NEGATIVE
BUN SERPL-MCNC: 11 MG/DL
CALCIUM SERPL-MCNC: 9.5 MG/DL
CAST: 0 /LPF
CHLORIDE SERPL-SCNC: 93 MMOL/L
CHOLEST SERPL-MCNC: 230 MG/DL
CO2 SERPL-SCNC: 23 MMOL/L
COLOR: YELLOW
CREAT SERPL-MCNC: 0.63 MG/DL
EGFRCR SERPLBLD CKD-EPI 2021: 95 ML/MIN/1.73M2
EOSINOPHIL # BLD AUTO: 0.41 K/UL
EOSINOPHIL NFR BLD AUTO: 5.1 %
EPITHELIAL CELLS: 0 /HPF
ESTIMATED AVERAGE GLUCOSE: 117 MG/DL
GLUCOSE QUALITATIVE U: NEGATIVE MG/DL
GLUCOSE SERPL-MCNC: 111 MG/DL
HBA1C MFR BLD HPLC: 5.7 %
HCT VFR BLD CALC: 43.6 %
HDLC SERPL-MCNC: 73 MG/DL
HGB BLD-MCNC: 14.5 G/DL
IMM GRANULOCYTES NFR BLD AUTO: 0.4 %
KETONES URINE: NEGATIVE MG/DL
LDLC SERPL-MCNC: 143 MG/DL
LEUKOCYTE ESTERASE URINE: NEGATIVE
LYMPHOCYTES # BLD AUTO: 2.26 K/UL
LYMPHOCYTES NFR BLD AUTO: 28.3 %
MAN DIFF?: NORMAL
MCHC RBC-ENTMCNC: 30.6 PG
MCHC RBC-ENTMCNC: 33.3 G/DL
MCV RBC AUTO: 92 FL
MICROSCOPIC-UA: NORMAL
MONOCYTES # BLD AUTO: 0.58 K/UL
MONOCYTES NFR BLD AUTO: 7.3 %
NEUTROPHILS # BLD AUTO: 4.62 K/UL
NEUTROPHILS NFR BLD AUTO: 57.9 %
NITRITE URINE: NEGATIVE
NONHDLC SERPL-MCNC: 156 MG/DL
PH URINE: 7
PLATELET # BLD AUTO: 341 K/UL
POTASSIUM SERPL-SCNC: 4.1 MMOL/L
PROT SERPL-MCNC: 7.7 G/DL
PROTEIN URINE: NEGATIVE MG/DL
RBC # BLD: 4.74 M/UL
RBC # FLD: 12.7 %
RED BLOOD CELLS URINE: 0 /HPF
SODIUM SERPL-SCNC: 131 MMOL/L
SPECIFIC GRAVITY URINE: 1.01
TRIGL SERPL-MCNC: 75 MG/DL
TSH SERPL-ACNC: 2.99 UIU/ML
UROBILINOGEN URINE: 0.2 MG/DL
VIT B12 SERPL-MCNC: 1125 PG/ML
WBC # FLD AUTO: 7.98 K/UL
WHITE BLOOD CELLS URINE: 0 /HPF